# Patient Record
Sex: FEMALE | Race: BLACK OR AFRICAN AMERICAN | Employment: OTHER | URBAN - METROPOLITAN AREA
[De-identification: names, ages, dates, MRNs, and addresses within clinical notes are randomized per-mention and may not be internally consistent; named-entity substitution may affect disease eponyms.]

---

## 2018-05-23 PROBLEM — Z79.01 LONG TERM (CURRENT) USE OF ANTICOAGULANTS: Status: ACTIVE | Noted: 2018-05-23

## 2019-01-31 ENCOUNTER — HOSPITAL ENCOUNTER (OUTPATIENT)
Dept: LAB | Age: 69
Discharge: HOME | End: 2019-01-31
Payer: MEDICARE

## 2019-01-31 DIAGNOSIS — I25.738 CORONARY ARTERY DISEASE OF NON-AUTOLOGOUS BIOLOGICAL BYPASS GRAFT WITH STABLE ANGINA PECTORIS (HCC): ICD-10-CM

## 2019-01-31 DIAGNOSIS — E78.5 DYSLIPIDEMIA: ICD-10-CM

## 2019-01-31 LAB
ALBUMIN SERPL-MCNC: 3.6 G/DL (ref 3.2–4.6)
ALBUMIN/GLOB SERPL: 0.9 {RATIO}
ALP SERPL-CCNC: 89 U/L (ref 50–136)
ALT SERPL-CCNC: 27 U/L (ref 12–65)
AST SERPL-CCNC: 32 U/L (ref 15–37)
BILIRUB DIRECT SERPL-MCNC: 0.1 MG/DL
BILIRUB SERPL-MCNC: 0.4 MG/DL (ref 0.2–1.1)
CHOLEST SERPL-MCNC: 168 MG/DL
GLOBULIN SER CALC-MCNC: 3.8 G/DL
HDLC SERPL-MCNC: 58 MG/DL (ref 40–60)
HDLC SERPL: 2.9 {RATIO}
LDLC SERPL CALC-MCNC: 102 MG/DL
LIPID PROFILE,FLP: NORMAL
PROT SERPL-MCNC: 7.4 G/DL (ref 6.3–8.2)
TRIGL SERPL-MCNC: 40 MG/DL (ref 35–150)
VLDLC SERPL CALC-MCNC: 8 MG/DL (ref 6–23)

## 2019-01-31 PROCEDURE — 80076 HEPATIC FUNCTION PANEL: CPT

## 2019-01-31 PROCEDURE — 80061 LIPID PANEL: CPT

## 2019-01-31 PROCEDURE — 36415 COLL VENOUS BLD VENIPUNCTURE: CPT

## 2019-08-02 ENCOUNTER — HOSPITAL ENCOUNTER (OUTPATIENT)
Dept: LAB | Age: 69
Discharge: HOME | End: 2019-08-02
Payer: MEDICARE

## 2019-08-02 DIAGNOSIS — E78.5 DYSLIPIDEMIA: ICD-10-CM

## 2019-08-02 DIAGNOSIS — I25.119 CORONARY ARTERY DISEASE INVOLVING NATIVE CORONARY ARTERY OF NATIVE HEART WITH ANGINA PECTORIS (HCC): Chronic | ICD-10-CM

## 2019-08-02 LAB
ALBUMIN SERPL-MCNC: 3.6 G/DL (ref 3.2–4.6)
ALBUMIN/GLOB SERPL: 1 {RATIO} (ref 1.2–3.5)
ALP SERPL-CCNC: 83 U/L (ref 50–136)
ALT SERPL-CCNC: 23 U/L (ref 12–65)
AST SERPL-CCNC: 33 U/L (ref 15–37)
BILIRUB DIRECT SERPL-MCNC: 0.2 MG/DL
BILIRUB SERPL-MCNC: 0.5 MG/DL (ref 0.2–1.1)
CHOLEST SERPL-MCNC: 160 MG/DL
GLOBULIN SER CALC-MCNC: 3.5 G/DL (ref 2.3–3.5)
HDLC SERPL-MCNC: 52 MG/DL (ref 40–60)
HDLC SERPL: 3.1 {RATIO}
LDLC SERPL CALC-MCNC: 100 MG/DL
LIPID PROFILE,FLP: NORMAL
PROT SERPL-MCNC: 7.1 G/DL (ref 6.3–8.2)
TRIGL SERPL-MCNC: 40 MG/DL (ref 35–150)
VLDLC SERPL CALC-MCNC: 8 MG/DL (ref 6–23)

## 2019-08-02 PROCEDURE — 36415 COLL VENOUS BLD VENIPUNCTURE: CPT

## 2019-08-02 PROCEDURE — 80061 LIPID PANEL: CPT

## 2019-08-02 PROCEDURE — 80076 HEPATIC FUNCTION PANEL: CPT

## 2021-05-26 ENCOUNTER — HOSPITAL ENCOUNTER (OUTPATIENT)
Dept: LAB | Age: 71
Discharge: HOME | End: 2021-05-26
Payer: MEDICARE

## 2021-05-26 DIAGNOSIS — I25.5 ISCHEMIC CARDIOMYOPATHY: Chronic | ICD-10-CM

## 2021-05-26 DIAGNOSIS — E78.5 DYSLIPIDEMIA: ICD-10-CM

## 2021-05-26 LAB
ALBUMIN SERPL-MCNC: 3.7 G/DL (ref 3.2–4.6)
ALBUMIN/GLOB SERPL: 0.9 {RATIO} (ref 1.2–3.5)
ALP SERPL-CCNC: 100 U/L (ref 50–136)
ALT SERPL-CCNC: 25 U/L (ref 12–65)
AST SERPL-CCNC: 28 U/L (ref 15–37)
BILIRUB DIRECT SERPL-MCNC: 0.1 MG/DL
BILIRUB SERPL-MCNC: 0.4 MG/DL (ref 0.2–1.1)
CHOLEST SERPL-MCNC: 180 MG/DL
GLOBULIN SER CALC-MCNC: 3.9 G/DL (ref 2.3–3.5)
HDLC SERPL-MCNC: 57 MG/DL (ref 40–60)
HDLC SERPL: 3.2 {RATIO}
LDLC SERPL CALC-MCNC: 116.8 MG/DL
PROT SERPL-MCNC: 7.6 G/DL (ref 6.3–8.2)
TRIGL SERPL-MCNC: 31 MG/DL (ref 35–150)
VLDLC SERPL CALC-MCNC: 6.2 MG/DL (ref 6–23)

## 2021-05-26 PROCEDURE — 36415 COLL VENOUS BLD VENIPUNCTURE: CPT

## 2021-05-26 PROCEDURE — 80061 LIPID PANEL: CPT

## 2021-05-26 PROCEDURE — 80076 HEPATIC FUNCTION PANEL: CPT

## 2021-05-27 NOTE — PROGRESS NOTES
Cholesterol is worse despite maximum Lipitor. Continue Lipitor as currently taking but add Zetia 10 mg daily. Repeat lipids in 3 months.   Needs to try to eat low-fat low-cholesterol diet is much as possible

## 2022-03-19 PROBLEM — Z79.01 LONG TERM (CURRENT) USE OF ANTICOAGULANTS: Status: ACTIVE | Noted: 2018-05-23

## 2022-05-20 LAB — INR BLD: 2.2

## 2022-05-24 PROBLEM — I51.3 APICAL MURAL THROMBUS: Status: ACTIVE | Noted: 2022-05-24

## 2022-05-24 PROBLEM — Z95.1 HX OF CABG: Status: ACTIVE | Noted: 2022-05-24

## 2022-05-25 ENCOUNTER — ANTI-COAG VISIT (OUTPATIENT)
Dept: CARDIOLOGY CLINIC | Age: 72
End: 2022-05-25

## 2022-05-25 ENCOUNTER — OFFICE VISIT (OUTPATIENT)
Dept: CARDIOLOGY CLINIC | Age: 72
End: 2022-05-25
Payer: MEDICARE

## 2022-05-25 VITALS
SYSTOLIC BLOOD PRESSURE: 130 MMHG | DIASTOLIC BLOOD PRESSURE: 68 MMHG | BODY MASS INDEX: 21.93 KG/M2 | WEIGHT: 139.7 LBS | HEIGHT: 67 IN | HEART RATE: 76 BPM

## 2022-05-25 DIAGNOSIS — I51.3 APICAL MURAL THROMBUS: ICD-10-CM

## 2022-05-25 DIAGNOSIS — Z79.01 LONG TERM (CURRENT) USE OF ANTICOAGULANTS: ICD-10-CM

## 2022-05-25 DIAGNOSIS — I50.22 CHRONIC SYSTOLIC CONGESTIVE HEART FAILURE (HCC): ICD-10-CM

## 2022-05-25 DIAGNOSIS — I10 PRIMARY HYPERTENSION: ICD-10-CM

## 2022-05-25 DIAGNOSIS — I25.5 ISCHEMIC CARDIOMYOPATHY: ICD-10-CM

## 2022-05-25 DIAGNOSIS — Z95.1 HX OF CABG: Primary | ICD-10-CM

## 2022-05-25 DIAGNOSIS — E78.5 DYSLIPIDEMIA: ICD-10-CM

## 2022-05-25 DIAGNOSIS — Z79.01 LONG TERM (CURRENT) USE OF ANTICOAGULANTS: Primary | ICD-10-CM

## 2022-05-25 PROCEDURE — 1123F ACP DISCUSS/DSCN MKR DOCD: CPT | Performed by: INTERNAL MEDICINE

## 2022-05-25 PROCEDURE — 99214 OFFICE O/P EST MOD 30 MIN: CPT | Performed by: INTERNAL MEDICINE

## 2022-05-25 PROCEDURE — 93000 ELECTROCARDIOGRAM COMPLETE: CPT | Performed by: INTERNAL MEDICINE

## 2022-05-25 RX ORDER — RAMIPRIL 10 MG/1
10 CAPSULE ORAL DAILY
Qty: 90 CAPSULE | Refills: 3 | Status: SHIPPED | OUTPATIENT
Start: 2022-05-25

## 2022-05-25 RX ORDER — WARFARIN SODIUM 5 MG/1
TABLET ORAL
Qty: 180 TABLET | Refills: 3 | Status: SHIPPED | OUTPATIENT
Start: 2022-05-25

## 2022-05-25 RX ORDER — ERGOCALCIFEROL (VITAMIN D2) 1250 MCG
50000 CAPSULE ORAL WEEKLY
COMMUNITY
Start: 2021-12-07

## 2022-05-25 RX ORDER — CARVEDILOL 6.25 MG/1
6.25 TABLET ORAL 2 TIMES DAILY
Qty: 180 TABLET | Refills: 3 | Status: SHIPPED | OUTPATIENT
Start: 2022-05-25

## 2022-05-25 RX ORDER — ATORVASTATIN CALCIUM 80 MG/1
80 TABLET, FILM COATED ORAL DAILY
Qty: 90 TABLET | Refills: 3 | Status: SHIPPED | OUTPATIENT
Start: 2022-05-25

## 2022-05-25 ASSESSMENT — ENCOUNTER SYMPTOMS
ABDOMINAL PAIN: 0
CONSTIPATION: 0
SORE THROAT: 0
COUGH: 0
PHOTOPHOBIA: 0
DIARRHEA: 0
SHORTNESS OF BREATH: 0
ABDOMINAL DISTENTION: 0

## 2022-05-25 NOTE — PROGRESS NOTES
[facility] CARDIOLOGY  [address], [address]  PHONE: [phone]        NAME:  Courtney Grover  : 1950  MRN: [id number]     PCP:  Manpreet Marr MD      SUBJECTIVE:   Courtney Grover is a 70 y.o. female seen for a follow up visit regarding the following:     Chief Complaint   Patient presents with    Hyperlipidemia    Coronary Artery Disease       HPI:    Doing well since last visit without interval angina, CHF, palpitations, edema, presyncope or syncope. Vitals controlled and tolerating meds well. Staying active without any significant limitations. Walking sporadically for exercise but not regularly. LDL high but sporadically taking zetia (causing lower GI bloating/discomfort) and missing lipitor again. Discussed repatha but she prefers to avoid injection for now and promises to be more compliant with statin/zetia- if LDL remains high on repeat LDL we will try to convince her to try bio-injectable at that time. Past Medical History, Past Surgical History, Family history, Social History, and Medications were all reviewed with the patient today and updated as necessary.      Current Outpatient Medications   Medication Sig Dispense Refill    ergocalciferol (ERGOCALCIFEROL) 1.25 MG (55168 UT) capsule Take 50,000 Units by mouth once a week      atorvastatin (LIPITOR) 80 MG tablet Take 1 tablet by mouth daily TAKE ONE TABLET BY MOUTH ONE TIME DAILY 90 tablet 3    carvedilol (COREG) 6.25 MG tablet Take 1 tablet by mouth 2 times daily TAKE ONE TABLET BY MOUTH TWICE A DAY WITH MEAL(S) 180 tablet 3    ramipril (ALTACE) 10 MG capsule Take 1 capsule by mouth daily 90 capsule 3    warfarin (COUMADIN) 5 MG tablet Take 2 tabs every day or as directed 180 tablet 3    alendronate (FOSAMAX) 70 MG tablet Take 70 mg by mouth every 7 days      aspirin 81 MG EC tablet Take by mouth daily      metFORMIN (GLUCOPHAGE-XR) 500 MG extended release tablet Takes one am, Two pm      ezetimibe (ZETIA) 10 MG tablet Take 10 mg by mouth daily (Patient not taking: Reported on 5/25/2022)       No current facility-administered medications for this visit. Allergies   Allergen Reactions    Ezetimibe      Myalgias    Niaspan [Niacin]      Myalgias       Patient Active Problem List    Diagnosis Date Noted    Hx of CABG 05/24/2022     Priority: Medium    Apical mural thrombus 05/24/2022     Priority: Medium    Long term (current) use of anticoagulants 05/23/2018    CAD (coronary artery disease), nonautologous biological bypass graft 06/03/2016    Ischemic cardiomyopathy 06/03/2016    Chronic systolic congestive heart failure ([facility] [address] 06/03/2016     Overview Note:     45-50% s/p AMI, CABG with apical aneurysmectomy, still with small apical   aneurysm present, high risk nidus for LV clot, on permanent coumadin as as   result        Osteoporosis 12/13/2012    DM2 (diabetes mellitus, type 2) ([facility] [address] 12/13/2012    Dyslipidemia 12/13/2012    CAD (coronary artery disease) 12/13/2012     Overview Note:     S/p MI and CABG and LV aneurysmectomy- lifelong Coumadin        HTN (hypertension) 12/13/2012        Past Surgical History:   Procedure Laterality Date    CARDIAC CATHETERIZATION  11/01/2007    CORONARY ARTERY BYPASS GRAFT  11/2007    with LV aneursymectomy    HYSTERECTOMY, TOTAL ABDOMINAL  1985    fibroids and dysplasia       Family History   Problem Relation Age of Onset    Cancer Sister 52        COLON    Cancer Maternal Aunt         Colon    Cancer Maternal Uncle         Bone    Elevated Lipids Sister     Diabetes Paternal Uncle     Elevated Lipids Sister     Elevated Lipids Sister     Elevated Lipids Sister     Cancer Mother 77        BREAST        Social History     Tobacco Use    Smoking status: Never Smoker    Smokeless tobacco: Never Used   Substance Use Topics    Alcohol use: Yes       ROS:    Review of Systems   Constitutional: Positive for fatigue. Negative for appetite change, chills and diaphoresis. HENT: Negative for congestion, mouth sores, nosebleeds, sore throat and tinnitus. Eyes: Negative for photophobia and visual disturbance. Respiratory: Negative for cough and shortness of breath. Cardiovascular: Negative for chest pain, palpitations and leg swelling. Gastrointestinal: Negative for abdominal distention, abdominal pain, constipation and diarrhea. Endocrine: Negative for cold intolerance, heat intolerance, polydipsia and polyuria. Genitourinary: Negative for dysuria and hematuria. Musculoskeletal: Negative for arthralgias, joint swelling and myalgias. Skin: Negative for rash. Allergic/Immunologic: Negative for environmental allergies and food allergies. Neurological: Negative for dizziness, seizures, syncope and light-headedness. Hematological: Negative for adenopathy. Does not bruise/bleed easily. Psychiatric/Behavioral: Negative for agitation, behavioral problems, dysphoric mood and hallucinations. The patient is not nervous/anxious. PHYSICAL EXAM:     Vitals:    05/25/22 1534   BP: 130/68   Pulse: 76   Weight: 139 lb 11.2 oz (63.4 kg)   Height: 5' 7\" (1.702 m)      Wt Readings from Last 3 Encounters:   05/25/22 139 lb 11.2 oz (63.4 kg)   11/22/21 144 lb 1.6 oz (65.4 kg)   05/26/21 143 lb (64.9 kg)      BP Readings from Last 3 Encounters:   05/25/22 130/68   11/22/21 138/74   05/26/21 136/84        Physical Exam  Constitutional:       Appearance: Normal appearance. She is normal weight. HENT:      Head: Normocephalic and atraumatic. Nose: Nose normal.      Mouth/Throat:      Mouth: Mucous membranes are moist.      Pharynx: Oropharynx is clear. Eyes:      Extraocular Movements: Extraocular movements intact. Pupils: Pupils are equal, round, and reactive to light. Neck:      Vascular: No carotid bruit or JVD. Cardiovascular:      Rate and Rhythm: Normal rate and regular rhythm. Heart sounds:  No murmur heard. No friction rub. No gallop. Pulmonary:      Effort: Pulmonary effort is normal.      Breath sounds: Normal breath sounds. No wheezing or rhonchi. Abdominal:      General: Abdomen is flat. Bowel sounds are normal. There is no distension. Palpations: Abdomen is soft. Tenderness: There is no abdominal tenderness. Musculoskeletal:         General: No swelling. Normal range of motion. Cervical back: Normal range of motion and neck supple. No tenderness. Skin:     General: Skin is warm and dry. Neurological:      General: No focal deficit present. Mental Status: She is alert and oriented to person, place, and time. Mental status is at baseline. Psychiatric:         Mood and Affect: Mood normal.         Behavior: Behavior normal.          Medical problems and test results were reviewed with the patient today. Lab Results   Component Value Date    CHOL 180 05/26/2021    CHOL 160 08/02/2019     Lab Results   Component Value Date    TRIG 31 (L) 05/26/2021    TRIG 40 08/02/2019     Lab Results   Component Value Date    HDL 57 05/26/2021    HDL 52 08/02/2019     Lab Results   Component Value Date    LDLCALC 116.8 (H) 05/26/2021    LDLCALC 100 08/02/2019     Lab Results   Component Value Date    LABVLDL 6.2 05/26/2021    LABVLDL 8 08/02/2019     Lab Results   Component Value Date    CHOLHDLRATIO 3.2 05/26/2021    CHOLHDLRATIO 3.1 08/02/2019     Results for orders placed or performed in visit on 05/25/22   EKG 12 lead    Impression    Sinus  Rhythm 76 bpm  -Right bundle branch block with left axis -bifascicular block. Old anterior infarct  Nonspecific ST-T wave changes        ASSESSMENT and PLAN    Carlo Coto was seen today for hyperlipidemia and coronary artery disease. Diagnoses and all orders for this visit:    Hx of CABG-doing well with no interval angina, syncope, CHF. Continue meds but try to increase dietary/exercise regimen.  -     Lipid Panel;  Future  -     Hepatic Function Panel; Future    Dyslipidemia-noncompliant with Zetia, taking sporadically but causing recurrent lower abdominal bloating and discomfort. Missing Lipitor at times as well. LDL chronically greater than 100. Emphasized compliance with statin, considering trying Zetia again. She wants to recheck her lipids in the next few weeks. If it is still elevated she will consider Repatha/Praluent but at this point she is not ready to commit. -     Lipid Panel; Future  -     Hepatic Function Panel; Future    Long term (current) use of anticoagulants-see below. Tolerating Coumadin well    Chronic systolic congestive heart failure (HCC)-stable, recheck echo next year. Clinically euvolemic and medically maximized  -     EKG 12 lead    Primary hypertension-controlled, continue meds and titrate. Low-sodium diet lifelong    Ischemic cardiomyopathy-well compensated, continue meds, increase dietary measures and exercise daily as tolerated  -     EKG 12 lead    Apical mural thrombus-lifelong anticoagulation as tolerated. Following pro time in our office. Stable with no interval bleeding. Other orders  -     atorvastatin (LIPITOR) 80 MG tablet; Take 1 tablet by mouth daily TAKE ONE TABLET BY MOUTH ONE TIME DAILY  -     carvedilol (COREG) 6.25 MG tablet; Take 1 tablet by mouth 2 times daily TAKE ONE TABLET BY MOUTH TWICE A DAY WITH MEAL(S)  -     ramipril (ALTACE) 10 MG capsule; Take 1 capsule by mouth daily  -     warfarin (COUMADIN) 5 MG tablet; Take 2 tabs every day or as directed            Return in about 6 months (around 11/25/2022).          Erik Villegas MD  5/25/2022  4:05 PM

## 2022-05-25 NOTE — PROGRESS NOTES
Anticoagulation Summary  As of 2022    INR goal:  2.0-3.0   TTR:  --   INR used for dosin.20 (2022)   Warfarin maintenance plan:  10 mg (5 mg x 2) every Mon, Wed, Fri; 7.5 mg (5 mg x 1.5) all other days   Weekly warfarin total:  60 mg   Plan last modified:  [address] [facility] [place] (2022)   Next INR check:  2022   Target end date: Indefinite    Indications    Long term (current) use of anticoagulants [Z79.01]  Apical mural thrombus [I51.3]             Anticoagulation Episode Summary     INR check location:  Outside Lab    Preferred lab:      Send INR reminders to:  [address]    Comments:  [facility]      Anticoagulation Care Providers     Provider Role Specialty Phone number    Ofelia Wing MD Responsible Cardiology [phone]      Tablet strength and weekly dosing schedule confirmed today.

## 2022-07-12 LAB — INR BLD: 3

## 2022-07-13 ENCOUNTER — ANTI-COAG VISIT (OUTPATIENT)
Dept: CARDIOLOGY CLINIC | Age: 72
End: 2022-07-13

## 2022-07-13 DIAGNOSIS — Z79.01 LONG TERM (CURRENT) USE OF ANTICOAGULANTS: Primary | ICD-10-CM

## 2022-07-13 DIAGNOSIS — I51.3 APICAL MURAL THROMBUS: ICD-10-CM

## 2022-07-13 NOTE — PATIENT INSTRUCTIONS
Reminder: Please contact the Coumadin Clinic at [phone] when you have medication changes. Examples, new medications, antibiotics, discontinued medications, new supplements, missed doses of warfarin or if you took extra doses of warfarin. This also includes OTC medications. Notifying us helps reduce the possibility of high and low INR's. In addition, if warfarin needs to be held for any procedures, please have surgeon or physician's office contact us before holding anticoagulant. Thanks, [facility] Cardiology Coumadin Clinic.

## 2022-07-13 NOTE — PROGRESS NOTES
INR result received by fax from [facility]. I tried to reach the patient but her voice mailbox was full and could not leave a message.

## 2022-08-31 LAB — INR BLD: 1.7

## 2022-09-01 ENCOUNTER — ANTI-COAG VISIT (OUTPATIENT)
Dept: CARDIOLOGY CLINIC | Age: 72
End: 2022-09-01

## 2022-09-01 DIAGNOSIS — I51.3 APICAL MURAL THROMBUS: ICD-10-CM

## 2022-09-01 DIAGNOSIS — Z79.01 LONG TERM (CURRENT) USE OF ANTICOAGULANTS: Primary | ICD-10-CM

## 2022-09-01 NOTE — PATIENT INSTRUCTIONS
Reminder: Please contact the Coumadin Clinic at [phone] when you have medication changes. Examples, new medications, antibiotics, discontinued medications, new supplements, missed doses of warfarin or if you took extra doses of warfarin. This also includes OTC medications. Notifying us helps reduce the possibility of high and low INR's. In addition, if warfarin needs to be held for any procedures, please have surgeon or physician's office contact us before holding anticoagulant. Thanks, [facility] Cardiology Coumadin Clinic.

## 2022-09-01 NOTE — PROGRESS NOTES
INR result received by fax from [facility], I tried to reach the patient to review result of 1.7. I had to leave a voicemail asking for her to give us a call back.

## 2022-09-01 NOTE — PROGRESS NOTES
I spoke with the patient, she knows of no reason for low INR.  Temporary adjustment for tonight's dose, take 10 mg instead of 7.5 mg.

## 2022-09-22 DIAGNOSIS — Z79.01 LONG TERM (CURRENT) USE OF ANTICOAGULANTS: ICD-10-CM

## 2022-09-22 DIAGNOSIS — I51.3 APICAL MURAL THROMBUS: Primary | ICD-10-CM

## 2022-09-22 LAB — INR BLD: 3

## 2022-09-23 ENCOUNTER — ANTI-COAG VISIT (OUTPATIENT)
Dept: CARDIOLOGY CLINIC | Age: 72
End: 2022-09-23

## 2022-09-23 DIAGNOSIS — I51.3 APICAL MURAL THROMBUS: ICD-10-CM

## 2022-09-23 DIAGNOSIS — Z79.01 LONG TERM (CURRENT) USE OF ANTICOAGULANTS: Primary | ICD-10-CM

## 2022-09-23 NOTE — PATIENT INSTRUCTIONS
Reminder: Please contact the Coumadin Clinic at [phone] when you have medication changes. Examples, new medications, antibiotics, discontinued medications, new supplements, missed doses of warfarin or if you took extra doses of warfarin. This also includes OTC medications. Notifying us helps reduce the possibility of high and low INR's. In addition, if warfarin needs to be held for any procedures, please have surgeon or physician's office contact us before holding anticoagulant. Thanks, [facility] Cardiology Coumadin Clinic.

## 2022-09-23 NOTE — PROGRESS NOTES
Tablet strength and weekly dosing schedule confirmed today. INR result received by fax from [facility], I spoke with the patient and reviewed result.

## 2022-11-01 LAB
INR BLD: 2.4
INR BLD: 2.4

## 2022-11-02 ENCOUNTER — ANTI-COAG VISIT (OUTPATIENT)
Dept: CARDIOLOGY CLINIC | Age: 72
End: 2022-11-02

## 2022-11-02 DIAGNOSIS — I51.3 APICAL MURAL THROMBUS: ICD-10-CM

## 2022-11-02 DIAGNOSIS — Z79.01 LONG TERM (CURRENT) USE OF ANTICOAGULANTS: Primary | ICD-10-CM

## 2022-11-07 ENCOUNTER — ANTI-COAG VISIT (OUTPATIENT)
Dept: CARDIOLOGY CLINIC | Age: 72
End: 2022-11-07

## 2022-11-07 DIAGNOSIS — I51.3 APICAL MURAL THROMBUS: ICD-10-CM

## 2022-11-07 DIAGNOSIS — Z79.01 LONG TERM (CURRENT) USE OF ANTICOAGULANTS: Primary | ICD-10-CM

## 2022-12-05 DIAGNOSIS — I51.3 APICAL MURAL THROMBUS: ICD-10-CM

## 2022-12-05 DIAGNOSIS — Z95.1 HX OF CABG: ICD-10-CM

## 2022-12-05 DIAGNOSIS — E78.5 DYSLIPIDEMIA: ICD-10-CM

## 2022-12-05 DIAGNOSIS — Z79.01 LONG TERM (CURRENT) USE OF ANTICOAGULANTS: ICD-10-CM

## 2022-12-06 DIAGNOSIS — Z79.01 LONG TERM (CURRENT) USE OF ANTICOAGULANTS: Primary | ICD-10-CM

## 2022-12-06 DIAGNOSIS — I51.3 APICAL MURAL THROMBUS: ICD-10-CM

## 2022-12-06 LAB
ALBUMIN SERPL-MCNC: 3.8 G/DL (ref 3.2–4.6)
ALBUMIN/GLOB SERPL: 1.1 {RATIO} (ref 0.4–1.6)
ALP SERPL-CCNC: 82 U/L (ref 50–136)
ALT SERPL-CCNC: 22 U/L (ref 12–65)
AST SERPL-CCNC: 27 U/L (ref 15–37)
BILIRUB DIRECT SERPL-MCNC: <0.1 MG/DL
BILIRUB SERPL-MCNC: 0.4 MG/DL (ref 0.2–1.1)
CHOLEST SERPL-MCNC: 207 MG/DL
GLOBULIN SER CALC-MCNC: 3.4 G/DL (ref 2.8–4.5)
HDLC SERPL-MCNC: 60 MG/DL (ref 40–60)
HDLC SERPL: 3.5 {RATIO}
LDLC SERPL CALC-MCNC: 137.4 MG/DL
PROT SERPL-MCNC: 7.2 G/DL (ref 6.3–8.2)
TRIGL SERPL-MCNC: 48 MG/DL (ref 35–150)
VLDLC SERPL CALC-MCNC: 9.6 MG/DL (ref 6–23)

## 2022-12-06 ASSESSMENT — ENCOUNTER SYMPTOMS
ABDOMINAL DISTENTION: 0
COUGH: 0
ABDOMINAL PAIN: 0
SORE THROAT: 0
SHORTNESS OF BREATH: 0
PHOTOPHOBIA: 0
DIARRHEA: 0
CONSTIPATION: 0

## 2022-12-06 NOTE — PROGRESS NOTES
[facility] CARDIOLOGY  [address], [address] [facility], [address] [place] [address]  PHONE: [phone]        NAME:  Tamera Manriquez  : 1950  MRN: [id number]     PCP:  Yeimy Gutierrez MD      SUBJECTIVE:   Tamera Manriquez is a 67 y.o. female seen for a follow up visit regarding the following:     Chief Complaint   Patient presents with    Hyperlipidemia     6 MONTH FOLLOW UP    Hypertension    Coronary Artery Disease       HPI:    Doing well since last visit without interval angina, CHF, palpitations, edema, presyncope or syncope. Vitals controlled and tolerating meds well. Staying active without any significant limitations. Walking sporadically for exercise but not regularly. LDL high but taking 80mg lipitor daily. Intolerant to zetia in past with GI discomfort. She cannot give herself repatha injections. She will consider [place] but isn't ready to commit. She will call me. Past Medical History, Past Surgical History, Family history, Social History, and Medications were all reviewed with the patient today and updated as necessary. Current Outpatient Medications   Medication Sig Dispense Refill    ergocalciferol (ERGOCALCIFEROL) 1.25 MG (87587 UT) capsule Take 50,000 Units by mouth once a week      atorvastatin (LIPITOR) 80 MG tablet Take 1 tablet by mouth daily TAKE ONE TABLET BY MOUTH ONE TIME DAILY 90 tablet 3    carvedilol (COREG) 6.25 MG tablet Take 1 tablet by mouth 2 times daily TAKE ONE TABLET BY MOUTH TWICE A DAY WITH MEAL(S) 180 tablet 3    ramipril (ALTACE) 10 MG capsule Take 1 capsule by mouth daily 90 capsule 3    warfarin (COUMADIN) 5 MG tablet Take 2 tabs every day or as directed 180 tablet 3    aspirin 81 MG EC tablet Take by mouth daily      metFORMIN (GLUCOPHAGE-XR) 500 MG extended release tablet 500 mg Takes two am, Two pm       No current facility-administered medications for this visit.             Allergies   Allergen Reactions    Ezetimibe      Myalgias Niaspan [Niacin]      Myalgias       Patient Active Problem List    Diagnosis Date Noted    Hx of CABG 05/24/2022     Priority: Medium    Apical mural thrombus 05/24/2022     Priority: Medium    Long term (current) use of anticoagulants 05/23/2018    CAD (coronary artery disease), nonautologous biological bypass graft 06/03/2016    Ischemic cardiomyopathy 06/03/2016    Chronic systolic congestive heart failure ([facility] [address] 06/03/2016     Overview Note:     45-50% s/p AMI, CABG with apical aneurysmectomy, still with small apical   aneurysm present, high risk nidus for LV clot, on permanent coumadin as as   result        Osteoporosis 12/13/2012    DM2 (diabetes mellitus, type 2) ([facility] [address] 12/13/2012    Dyslipidemia 12/13/2012    CAD (coronary artery disease) 12/13/2012     Overview Note:     S/p MI and CABG and LV aneurysmectomy- lifelong Coumadin        HTN (hypertension) 12/13/2012        Past Surgical History:   Procedure Laterality Date    CARDIAC CATHETERIZATION  11/01/2007    CORONARY ARTERY BYPASS GRAFT  11/2007    with LV aneursymectomy    HYSTERECTOMY, TOTAL ABDOMINAL (CERVIX REMOVED)  1985    fibroids and dysplasia       Family History   Problem Relation Age of Onset    Cancer Sister 52        COLON    Cancer Maternal Aunt         Colon    Cancer Maternal Uncle         Bone    Elevated Lipids Sister     Diabetes Paternal Uncle     Elevated Lipids Sister     Elevated Lipids Sister     Elevated Lipids Sister     Cancer Mother 77        BREAST        Social History     Tobacco Use    Smoking status: Never    Smokeless tobacco: Never   Substance Use Topics    Alcohol use: Yes       ROS:    Review of Systems   Constitutional:  Positive for fatigue. Negative for appetite change, chills and diaphoresis. HENT:  Negative for congestion, mouth sores, nosebleeds, sore throat and tinnitus. Eyes:  Negative for photophobia and visual disturbance. Respiratory:  Negative for cough and shortness of breath.     Cardiovascular: Negative for chest pain, palpitations and leg swelling. Gastrointestinal:  Negative for abdominal distention, abdominal pain, constipation and diarrhea. Endocrine: Negative for cold intolerance, heat intolerance, polydipsia and polyuria. Genitourinary:  Negative for dysuria and hematuria. Musculoskeletal:  Negative for arthralgias, joint swelling and myalgias. Skin:  Negative for rash. Allergic/Immunologic: Negative for environmental allergies and food allergies. Neurological:  Negative for dizziness, seizures, syncope and light-headedness. Hematological:  Negative for adenopathy. Does not bruise/bleed easily. Psychiatric/Behavioral:  Negative for agitation, behavioral problems, dysphoric mood and hallucinations. The patient is not nervous/anxious. PHYSICAL EXAM:     Vitals:    12/07/22 1101   BP: 134/76   Pulse: 90   Weight: 145 lb (65.8 kg)   Height: 5' 7\" (1.702 m)      Wt Readings from Last 3 Encounters:   12/07/22 145 lb (65.8 kg)   05/25/22 139 lb 11.2 oz (63.4 kg)   11/22/21 144 lb 1.6 oz (65.4 kg)      BP Readings from Last 3 Encounters:   12/07/22 134/76   05/25/22 130/68   11/22/21 138/74        Physical Exam  Constitutional:       Appearance: Normal appearance. She is normal weight. HENT:      Head: Normocephalic and atraumatic. Nose: Nose normal.      Mouth/Throat:      Mouth: Mucous membranes are moist.      Pharynx: Oropharynx is clear. Eyes:      Extraocular Movements: Extraocular movements intact. Pupils: Pupils are equal, round, and reactive to light. Neck:      Vascular: No carotid bruit or JVD. Cardiovascular:      Rate and Rhythm: Normal rate and regular rhythm. Heart sounds: No murmur heard. No friction rub. No gallop. Pulmonary:      Effort: Pulmonary effort is normal.      Breath sounds: Normal breath sounds. No wheezing or rhonchi. Abdominal:      General: Abdomen is flat. Bowel sounds are normal. There is no distension.       Palpations: Abdomen is soft. Tenderness: There is no abdominal tenderness. Musculoskeletal:         General: No swelling. Normal range of motion. Cervical back: Normal range of motion and neck supple. No tenderness. Skin:     General: Skin is warm and dry. Neurological:      General: No focal deficit present. Mental Status: She is alert and oriented to person, place, and time. Mental status is at baseline. Psychiatric:         Mood and Affect: Mood normal.         Behavior: Behavior normal.        Medical problems and test results were reviewed with the patient today. Lab Results   Component Value Date    CHOL 207 (H) 12/05/2022    CHOL 180 05/26/2021    CHOL 160 08/02/2019     Lab Results   Component Value Date    TRIG 48 12/05/2022    TRIG 31 (L) 05/26/2021    TRIG 40 08/02/2019     Lab Results   Component Value Date    HDL 60 12/05/2022    HDL 57 05/26/2021    HDL 52 08/02/2019     Lab Results   Component Value Date    LDLCALC 137.4 (H) 12/05/2022    LDLCALC 116.8 (H) 05/26/2021    LDLCALC 100 08/02/2019     Lab Results   Component Value Date    LABVLDL 9.6 12/05/2022    LABVLDL 6.2 05/26/2021    LABVLDL 8 08/02/2019     Lab Results   Component Value Date    CHOLHDLRATIO 3.5 12/05/2022    CHOLHDLRATIO 3.2 05/26/2021    CHOLHDLRATIO 3.1 08/02/2019     No results found for any visits on 12/07/22. ASSESSMENT and PLAN    Agnes Martinez was seen today for hyperlipidemia and coronary artery disease. Diagnoses and all orders for this visit:    Hx of CABG-doing well with no interval angina, syncope, CHF. Continue meds but try to increase dietary/exercise regimen. Needs better lipid control, see below    Dyslipidemia-noncompliant with Zetia due to GI intolerance, taking sporadically but causing recurrent lower abdominal bloating and discomfort. Denies missing any Lipitor recently, on max dose, but LDL chronically greater than 100.   Emphasized compliance with statin, considering tryin healthy diet and exercise. She does not think she can give herself twice monthly injections of Repatha or Praluent but is willing to consider Leqvio. She will call me if and when she decides to proceed. Long term (current) use of anticoagulants-see below. Tolerating Coumadin well    Chronic systolic congestive heart failure (HCC)-stable, recheck echo next year. Clinically euvolemic and medically maximized    Primary hypertension-controlled, continue meds and titrate. Low-sodium diet lifelong    Ischemic cardiomyopathy-well compensated, continue meds, increase dietary measures and exercise daily as tolerated    Apical mural thrombus-lifelong anticoagulation as tolerated. Following pro time in our office. Stable with no interval bleeding. Other orders  Consider repatha or lequvio- she has aversion to injections and just isn't ready to commit. She will call me with her decision sometime soon. Return in about 6 months (around 6/7/2023).          Corky San MD  12/7/2022  11:17 AM

## 2022-12-07 ENCOUNTER — OFFICE VISIT (OUTPATIENT)
Dept: CARDIOLOGY CLINIC | Age: 72
End: 2022-12-07
Payer: MEDICARE

## 2022-12-07 VITALS
HEIGHT: 67 IN | HEART RATE: 90 BPM | SYSTOLIC BLOOD PRESSURE: 134 MMHG | WEIGHT: 145 LBS | DIASTOLIC BLOOD PRESSURE: 76 MMHG | BODY MASS INDEX: 22.76 KG/M2

## 2022-12-07 DIAGNOSIS — Z95.1 HX OF CABG: ICD-10-CM

## 2022-12-07 DIAGNOSIS — Z79.01 LONG TERM (CURRENT) USE OF ANTICOAGULANTS: ICD-10-CM

## 2022-12-07 DIAGNOSIS — E78.5 DYSLIPIDEMIA: ICD-10-CM

## 2022-12-07 DIAGNOSIS — I25.5 ISCHEMIC CARDIOMYOPATHY: ICD-10-CM

## 2022-12-07 DIAGNOSIS — I10 PRIMARY HYPERTENSION: ICD-10-CM

## 2022-12-07 DIAGNOSIS — I25.10 CORONARY ARTERY DISEASE INVOLVING NATIVE CORONARY ARTERY OF NATIVE HEART WITHOUT ANGINA PECTORIS: Primary | ICD-10-CM

## 2022-12-07 DIAGNOSIS — I50.22 CHRONIC SYSTOLIC CONGESTIVE HEART FAILURE (HCC): ICD-10-CM

## 2022-12-07 PROCEDURE — 3078F DIAST BP <80 MM HG: CPT | Performed by: INTERNAL MEDICINE

## 2022-12-07 PROCEDURE — 3075F SYST BP GE 130 - 139MM HG: CPT | Performed by: INTERNAL MEDICINE

## 2022-12-07 PROCEDURE — 99214 OFFICE O/P EST MOD 30 MIN: CPT | Performed by: INTERNAL MEDICINE

## 2022-12-07 PROCEDURE — 1123F ACP DISCUSS/DSCN MKR DOCD: CPT | Performed by: INTERNAL MEDICINE

## 2022-12-10 LAB
INR PPP: 2.5 (ref 0.9–1.2)
PROTHROMBIN TIME: 24.8 SEC (ref 9.1–12)

## 2022-12-12 ENCOUNTER — ANTI-COAG VISIT (OUTPATIENT)
Dept: CARDIOLOGY CLINIC | Age: 72
End: 2022-12-12

## 2022-12-12 DIAGNOSIS — Z79.01 LONG TERM (CURRENT) USE OF ANTICOAGULANTS: Primary | ICD-10-CM

## 2022-12-12 DIAGNOSIS — I51.3 APICAL MURAL THROMBUS: ICD-10-CM

## 2022-12-12 NOTE — PROGRESS NOTES
Home INR monitor result reported via [facility], result and dosing reviewed with the patient. Continue current maintenance plan (see Anticoag Dosing Calendar). INR to be rechecked in four weeks.

## 2023-01-21 LAB
INR PPP: 3.1 (ref 0.9–1.2)
PROTHROMBIN TIME: 30.8 SEC (ref 9.1–12)

## 2023-01-23 ENCOUNTER — ANTI-COAG VISIT (OUTPATIENT)
Dept: CARDIOLOGY CLINIC | Age: 73
End: 2023-01-23
Payer: MEDICARE

## 2023-01-23 DIAGNOSIS — I51.3 APICAL MURAL THROMBUS: ICD-10-CM

## 2023-01-23 DIAGNOSIS — Z79.01 LONG TERM (CURRENT) USE OF ANTICOAGULANTS: Primary | ICD-10-CM

## 2023-01-23 PROCEDURE — 93793 ANTICOAG MGMT PT WARFARIN: CPT | Performed by: INTERNAL MEDICINE

## 2023-01-23 PROCEDURE — 85610 PROTHROMBIN TIME: CPT | Performed by: INTERNAL MEDICINE

## 2023-01-23 NOTE — PATIENT INSTRUCTIONS
Reminder: Please contact the Coumadin Clinic at [phone] when you have medication changes. Examples, new medications, antibiotics, discontinued medications, new supplements, missed doses of warfarin or if you took extra doses of warfarin. This also includes OTC medications. Notifying us helps reduce the possibility of high and low INR's. In addition, if warfarin needs to be held for any procedures, please have surgeon or physician's office contact us before holding anticoagulant. Thanks, [facility] Cardiology Coumadin Clinic.

## 2023-01-23 NOTE — PROGRESS NOTES
INR result received by fax from [facility]. Continue current maintenance plan (see Anticoag Dosing Calendar). INR to be rechecked in four week(s). Communicated with the patient via My Chart.

## 2023-03-06 LAB — INR BLD: 2

## 2023-03-07 ENCOUNTER — ANTI-COAG VISIT (OUTPATIENT)
Dept: CARDIOLOGY CLINIC | Age: 73
End: 2023-03-07
Payer: MEDICARE

## 2023-03-07 DIAGNOSIS — Z79.01 LONG TERM (CURRENT) USE OF ANTICOAGULANTS: Primary | ICD-10-CM

## 2023-03-07 DIAGNOSIS — I51.3 APICAL MURAL THROMBUS: ICD-10-CM

## 2023-03-07 LAB
INR PPP: 2 (ref 0.9–1.2)
PROTHROMBIN TIME: 20.2 SEC (ref 9.1–12)

## 2023-03-07 PROCEDURE — 93793 ANTICOAG MGMT PT WARFARIN: CPT | Performed by: INTERNAL MEDICINE

## 2023-03-07 NOTE — PROGRESS NOTES
Home INR monitor result reported via fax, therapeutic INR, no dose adjustments made. See (Anticoag Dosing Calendar). Recheck INR in four week(s).

## 2023-03-14 ENCOUNTER — ANTI-COAG VISIT (OUTPATIENT)
Dept: CARDIOLOGY CLINIC | Age: 73
End: 2023-03-14

## 2023-03-14 DIAGNOSIS — Z79.01 LONG TERM (CURRENT) USE OF ANTICOAGULANTS: Primary | ICD-10-CM

## 2023-03-14 DIAGNOSIS — I51.3 APICAL MURAL THROMBUS: ICD-10-CM

## 2023-04-19 ENCOUNTER — ANTI-COAG VISIT (OUTPATIENT)
Dept: CARDIOLOGY CLINIC | Age: 73
End: 2023-04-19
Payer: MEDICARE

## 2023-04-19 DIAGNOSIS — I51.3 APICAL MURAL THROMBUS: ICD-10-CM

## 2023-04-19 DIAGNOSIS — Z79.01 LONG TERM (CURRENT) USE OF ANTICOAGULANTS: Primary | ICD-10-CM

## 2023-04-19 LAB
INR PPP: 3.2 (ref 0.9–1.2)
PROTHROMBIN TIME: 31.6 SEC (ref 9.1–12)

## 2023-04-19 PROCEDURE — 93793 ANTICOAG MGMT PT WARFARIN: CPT | Performed by: INTERNAL MEDICINE

## 2023-04-19 NOTE — PROGRESS NOTES
INR result received by fax from [facility] lab, I spoke with the patient and reviewed result. Continue current maintenance plan (see Anticoag Dosing Calendar). INR to be rechecked in four week(s).

## 2023-05-16 ENCOUNTER — TELEPHONE (OUTPATIENT)
Age: 73
End: 2023-05-16

## 2023-05-16 NOTE — TELEPHONE ENCOUNTER
Physician's response faxed to [phone]. \"Low to moderate risk for sedation and anesthesia  Okay to hold warfarin 3-4 days prior to the procedure, resume postoperatively ASAP. Use aggressive local measures to control bleeding. \"  Mala Miller MD

## 2023-05-16 NOTE — TELEPHONE ENCOUNTER
Cardiac Clearance        Physician or Practice Requesting:[facility]-Dr. Gerda Smith Person: Cynthia  Contact Phone Number: [phone]  Fax Number: [phone]  Date of Surgery/Procedure: 05/23/2023  Type of Surgery or Procedure: Oral Surgery of 3 rd molar  Type of Anesthesia: IV or local    Type of Clearance Requested:   Medication to Hold:Coumadin  Days to Hold: Cardiologist to Determine

## 2023-05-16 NOTE — TELEPHONE ENCOUNTER
Low to moderate risk for sedation and anesthesia  Okay to hold warfarin 3-4 days prior to the procedure, resume postoperatively ASAP. Use aggressive local measures to control bleeding.

## 2023-06-08 LAB — INR BLD: 1.3

## 2023-06-09 ENCOUNTER — ANTI-COAG VISIT (OUTPATIENT)
Age: 73
End: 2023-06-09

## 2023-06-09 DIAGNOSIS — I51.3 APICAL MURAL THROMBUS: ICD-10-CM

## 2023-06-09 DIAGNOSIS — Z79.01 LONG TERM (CURRENT) USE OF ANTICOAGULANTS: Primary | ICD-10-CM

## 2023-06-09 NOTE — PROGRESS NOTES
Tablet strength and weekly dosing schedule confirmed today. Patient was off of warfarin for four days and also started dietary supplements with vitamin k. Increased her doses for the next few day and will recheck her while she is in the office on 06/14/2023.

## 2023-06-26 LAB — INR BLD: 1.9

## 2023-06-27 ENCOUNTER — ANTI-COAG VISIT (OUTPATIENT)
Age: 73
End: 2023-06-27
Payer: MEDICARE

## 2023-06-27 DIAGNOSIS — Z79.01 LONG TERM (CURRENT) USE OF ANTICOAGULANTS: Primary | ICD-10-CM

## 2023-06-27 DIAGNOSIS — I51.3 APICAL MURAL THROMBUS: ICD-10-CM

## 2023-06-27 PROCEDURE — 93793 ANTICOAG MGMT PT WARFARIN: CPT | Performed by: INTERNAL MEDICINE

## 2023-07-19 LAB — INR BLD: 1.5

## 2023-07-20 ENCOUNTER — ANTI-COAG VISIT (OUTPATIENT)
Age: 73
End: 2023-07-20

## 2023-07-20 DIAGNOSIS — I51.3 APICAL MURAL THROMBUS: ICD-10-CM

## 2023-07-20 DIAGNOSIS — Z79.01 LONG TERM (CURRENT) USE OF ANTICOAGULANTS: Primary | ICD-10-CM

## 2023-07-20 NOTE — PROGRESS NOTES
Patient has been taking a multi vitamin with vitamin k in it. She also started a Vitamin D supplement. Her diet has also changed and she is eating more leafy green vegetables.

## 2023-08-10 ENCOUNTER — TELEPHONE (OUTPATIENT)
Age: 73
End: 2023-08-10

## 2023-08-10 NOTE — TELEPHONE ENCOUNTER
Cardiac Clearance        Physician or Practice Requesting:Dr. Srinath Alatorre @ [facility]  : David Martelld  Contact Phone Number: [phone]  Fax Number: [phone]  Date of Surgery/Procedure: 11/30/2023  Type of Surgery or Procedure: Colonoscopy & Upper Endoscopy      Type of Anesthesia: N/A  Type of Clearance Requested:   Medication to Hold:Coumadin  Days to Hold: 5 days prior

## 2023-08-11 NOTE — TELEPHONE ENCOUNTER
Low to moderate risk, okay to hold warfarin 4 to 5 days as requested. Resume postoperatively ASAP when okay with operating physician.   Continue other cardiac medications throughout the procedure unnterrupted

## 2023-08-17 LAB — INR BLD: 2.4

## 2023-08-18 ENCOUNTER — ANTI-COAG VISIT (OUTPATIENT)
Age: 73
End: 2023-08-18

## 2023-08-18 DIAGNOSIS — Z79.01 LONG TERM (CURRENT) USE OF ANTICOAGULANTS: Primary | ICD-10-CM

## 2023-08-18 DIAGNOSIS — I51.3 APICAL MURAL THROMBUS: ICD-10-CM

## 2023-08-18 NOTE — PROGRESS NOTES
Warfarin tablet strength and weekly dosing schedule confirmed today. Continue current maintenance plan (see Anticoag Dosing Calendar). INR to be rechecked in four week(s).

## 2023-10-02 LAB — INR BLD: 1.5

## 2023-10-03 ENCOUNTER — ANTI-COAG VISIT (OUTPATIENT)
Age: 73
End: 2023-10-03

## 2023-10-03 DIAGNOSIS — Z79.01 LONG TERM (CURRENT) USE OF ANTICOAGULANTS: Primary | ICD-10-CM

## 2023-10-03 DIAGNOSIS — I51.3 APICAL MURAL THROMBUS: ICD-10-CM

## 2023-10-03 NOTE — PROGRESS NOTES
[facility] INR result reported via fax, subtherapeutic INR, . Tried to reach patient, no answer, no voice mail, will try again later today//[email or web address]    Talked with patient Warfarin tablet strength and weekly dosing schedule confirmed today. One time dose of 12.5 mg tonight instead of 10 mg. Then continue current maintenance plan (see Anticoag Dosing Calendar). INR to be rechecked in one week(s).  Has added Vitamin K2 to her medications

## 2023-10-12 LAB — INR BLD: 1.3

## 2023-10-13 ENCOUNTER — ANTI-COAG VISIT (OUTPATIENT)
Age: 73
End: 2023-10-13

## 2023-10-13 DIAGNOSIS — Z79.01 LONG TERM (CURRENT) USE OF ANTICOAGULANTS: Primary | ICD-10-CM

## 2023-10-13 DIAGNOSIS — I51.3 APICAL MURAL THROMBUS: ICD-10-CM

## 2023-10-19 LAB — INR BLD: 3.7

## 2023-10-20 ENCOUNTER — ANTI-COAG VISIT (OUTPATIENT)
Age: 73
End: 2023-10-20

## 2023-10-20 DIAGNOSIS — I51.3 APICAL MURAL THROMBUS: ICD-10-CM

## 2023-10-20 DIAGNOSIS — Z79.01 LONG TERM (CURRENT) USE OF ANTICOAGULANTS: Primary | ICD-10-CM

## 2023-10-20 NOTE — PROGRESS NOTES
INR report received from [facility]. Patient held her Vitamin K supplement for two days. She stated she did this to get a \"higher\" INR reading. I reminded her that she has been subtherapeutic many months now due to taking vitamin k supplement. She says she plans to resume it and continue taking it. Warfarin dosing not adjusted but recommended she have her INR rechecked in three days.

## 2023-10-31 LAB
INR BLD: 2.5
INR BLD: 2.5

## 2023-11-02 ENCOUNTER — TELEPHONE (OUTPATIENT)
Age: 73
End: 2023-11-02

## 2023-11-02 ENCOUNTER — ANTI-COAG VISIT (OUTPATIENT)
Age: 73
End: 2023-11-02

## 2023-11-02 DIAGNOSIS — I51.3 APICAL MURAL THROMBUS: ICD-10-CM

## 2023-11-02 DIAGNOSIS — Z79.01 LONG TERM (CURRENT) USE OF ANTICOAGULANTS: Primary | ICD-10-CM

## 2023-11-02 DIAGNOSIS — I25.10 CAD (CORONARY ARTERY DISEASE): Primary | ICD-10-CM

## 2023-11-02 NOTE — PROGRESS NOTES
[facility] faxed INR result reported via fax, therapeutic INR, no dose adjustments made. Continue current maintenance plan (see Anticoag Dosing Calendar). INR to be rechecked in two week(s). Tried to reach patient multiple times, no answer, no voice mail. Patient returned call INR results given confirmed dosing.  Patient had questions about her billing of $29.00 she will contact billing department//[email or web address]

## 2023-11-02 NOTE — PATIENT INSTRUCTIONS
Reminder: Please contact the Coumadin Clinic at [phone] when you have medication changes. Examples, new medications, antibiotics, discontinued medications, new supplements, missed doses of warfarin or if you took extra doses of warfarin. This also includes OTC medications. Notifying us helps reduce the possibility of high and low INR's. In addition, if warfarin needs to be held for any procedures, please have surgeon or physician's office contact us before holding anticoagulant. Thanks, [facility] Cardiology Coumadin Clinic.

## 2023-11-06 NOTE — TELEPHONE ENCOUNTER
Called patient who voiced understanding of Dr. Schwartz' response.   Pt states that she has researched this and talked with several friends in regards to this test and that she wants to have the test.

## 2023-11-06 NOTE — TELEPHONE ENCOUNTER
Calcium score is good to diagnose coronary disease in patients without prior cardiac history.  She has already had bypass.  I know her calcium score will be positive.  This will not serve any purpose and it will not change any management or medications at this point from a cardiovascular perspective.  The only thing it will do is cost her $115 and submit her to radiation that she does not need.  I would avoid getting this if she is agreeable.

## 2023-11-28 LAB — INR BLD: 2.5

## 2023-11-29 ENCOUNTER — ANTI-COAG VISIT (OUTPATIENT)
Age: 73
End: 2023-11-29
Payer: MEDICARE

## 2023-11-29 DIAGNOSIS — Z79.01 LONG TERM (CURRENT) USE OF ANTICOAGULANTS: Primary | ICD-10-CM

## 2023-11-29 DIAGNOSIS — I51.3 APICAL MURAL THROMBUS: ICD-10-CM

## 2023-11-29 PROCEDURE — 93793 ANTICOAG MGMT PT WARFARIN: CPT | Performed by: INTERNAL MEDICINE

## 2023-11-29 NOTE — PROGRESS NOTES
Home INR monitor result reported via fax, therapeutic INR, no dose adjustments made. I spoke with the patient, warfarin tablet strength and weekly dosing schedule confirmed today. Continue current maintenance plan (see Anticoag Dosing Calendar). INR to be rechecked in four week(s).

## 2023-12-10 ASSESSMENT — ENCOUNTER SYMPTOMS
COUGH: 0
SORE THROAT: 0
DIARRHEA: 0
ABDOMINAL PAIN: 0
PHOTOPHOBIA: 0
CONSTIPATION: 0
SHORTNESS OF BREATH: 0
ABDOMINAL DISTENTION: 0

## 2023-12-10 NOTE — PROGRESS NOTES
[facility] CARDIOLOGY  [address] [address]  PHONE: [phone]        NAME:  Robson Church  : 1950  MRN: [id number]     PCP:  Sukumar Simmons MD      SUBJECTIVE:   Robson Church is a 68 y.o. female seen for a follow up visit regarding the following:     Chief Complaint   Patient presents with    Coronary Artery Disease       HPI:    Doing well since last visit without interval angina, CHF, palpitations, edema, presyncope or syncope. Vitals controlled and tolerating meds well. Staying active without any significant limitations. Walking sporadically for exercise but not regularly. LDL high but taking 80mg lipitor daily. Intolerant to zetia in past x 2 with GI discomfort. She \"cannot give herself\" repatha injections. She considered [place] but isn't ready to commit. She will call me if she changes her mind. Echo 2023:  Left Ventricle Low normal left ventricular systolic function with a visually estimated EF of 50 - 55%. Left ventricle size is normal. Normal wall thickness. Akinesis of the apex. Abnormal diastolic function. Left Atrium Left atrium is mildly dilated. Right Ventricle Right ventricle size is normal. RV basal diameter is 3.9 cm. RV mid diameter is 2.7 cm. Normal systolic function. Right Atrium Right atrium is mildly dilated. Aortic Valve Sclerosis of the aortic valve cusp. Trace regurgitation. No stenosis. Mitral Valve Valve structure is normal. Mild regurgitation. No stenosis noted. Tricuspid Valve Valve structure is normal. Mild regurgitation. No stenosis noted. The estimated RVSP is 26 mmHg. Pulmonic Valve Valve structure is normal. Mild regurgitation. No stenosis noted. Aorta Ao root diameter is 3.3 cm. Pericardium No pericardial effusion. Doing well since last visit without interval angina, CHF, palpitations, edema, presyncope or syncope. Vitals controlled and tolerating meds well.  Staying active without

## 2023-12-12 ENCOUNTER — OFFICE VISIT (OUTPATIENT)
Age: 73
End: 2023-12-12
Payer: MEDICARE

## 2023-12-12 VITALS
HEART RATE: 76 BPM | WEIGHT: 127 LBS | SYSTOLIC BLOOD PRESSURE: 120 MMHG | DIASTOLIC BLOOD PRESSURE: 80 MMHG | HEIGHT: 67 IN | BODY MASS INDEX: 19.93 KG/M2

## 2023-12-12 DIAGNOSIS — I50.22 CHRONIC SYSTOLIC CONGESTIVE HEART FAILURE (HCC): ICD-10-CM

## 2023-12-12 DIAGNOSIS — I25.5 ISCHEMIC CARDIOMYOPATHY: ICD-10-CM

## 2023-12-12 DIAGNOSIS — I51.3 APICAL MURAL THROMBUS: ICD-10-CM

## 2023-12-12 DIAGNOSIS — I25.10 CORONARY ARTERY DISEASE INVOLVING NATIVE CORONARY ARTERY OF NATIVE HEART WITHOUT ANGINA PECTORIS: Primary | ICD-10-CM

## 2023-12-12 DIAGNOSIS — I10 PRIMARY HYPERTENSION: ICD-10-CM

## 2023-12-12 PROCEDURE — 99214 OFFICE O/P EST MOD 30 MIN: CPT | Performed by: INTERNAL MEDICINE

## 2023-12-12 PROCEDURE — 1123F ACP DISCUSS/DSCN MKR DOCD: CPT | Performed by: INTERNAL MEDICINE

## 2023-12-12 PROCEDURE — 3074F SYST BP LT 130 MM HG: CPT | Performed by: INTERNAL MEDICINE

## 2023-12-12 PROCEDURE — 3079F DIAST BP 80-89 MM HG: CPT | Performed by: INTERNAL MEDICINE

## 2023-12-12 RX ORDER — PANTOPRAZOLE SODIUM 40 MG/1
TABLET, DELAYED RELEASE ORAL
COMMUNITY
Start: 2023-11-30

## 2023-12-15 ENCOUNTER — TELEPHONE (OUTPATIENT)
Age: 73
End: 2023-12-15

## 2023-12-15 LAB
ALBUMIN SERPL-MCNC: 4.4 G/DL (ref 3.8–4.8)
ALP SERPL-CCNC: 75 IU/L (ref 44–121)
ALT SERPL-CCNC: 18 IU/L (ref 0–32)
AST SERPL-CCNC: 42 IU/L (ref 0–40)
BILIRUB DIRECT SERPL-MCNC: 0.18 MG/DL (ref 0–0.4)
BILIRUB SERPL-MCNC: 0.8 MG/DL (ref 0–1.2)
CHOLEST SERPL-MCNC: 234 MG/DL (ref 100–199)
HDLC SERPL-MCNC: 61 MG/DL
LDLC SERPL CALC-MCNC: 164 MG/DL (ref 0–99)
PROT SERPL-MCNC: 7.3 G/DL (ref 6–8.5)
TRIGL SERPL-MCNC: 56 MG/DL (ref 0–149)
VLDLC SERPL CALC-MCNC: 9 MG/DL (ref 5–40)

## 2023-12-15 NOTE — TELEPHONE ENCOUNTER
----- Message from Roger Schwartz MD sent at 12/15/2023  7:44 AM EST -----  Lipids even worse.  Cholesterol up to 164.  This will definitely increase her risk for cardiovascular problems and more blockages, more heart attacks, more heart failure.  I would strongly encourage her to consider Repatha.  If she wishes to come in and get a demonstration and get her first dose here in the office lets do that.  She has been hesitant because of the needles.  ----- Message -----  From: Devon Glasgow Incoming Amb Ref Lab Orders To [facility]  Sent: 12/15/2023   7:40 AM EST  To: Roger Schwartz MD

## 2024-01-05 LAB — INR BLD: 2.2

## 2024-01-08 ENCOUNTER — ANTI-COAG VISIT (OUTPATIENT)
Age: 74
End: 2024-01-08
Payer: MEDICARE

## 2024-01-08 DIAGNOSIS — Z79.01 LONG TERM (CURRENT) USE OF ANTICOAGULANTS: Primary | ICD-10-CM

## 2024-01-08 DIAGNOSIS — I51.3 APICAL MURAL THROMBUS: ICD-10-CM

## 2024-01-08 PROCEDURE — 93793 ANTICOAG MGMT PT WARFARIN: CPT | Performed by: INTERNAL MEDICINE

## 2024-01-08 NOTE — PROGRESS NOTES
INR result received from [facility], therapeutic INR, no dose adjustments made. Continue current maintenance plan (see Anticoag Dosing Calendar). INR to be rechecked in four week(s). Attempted to reach the patient, no answer and mail box full.

## 2024-01-10 ENCOUNTER — TELEPHONE (OUTPATIENT)
Age: 74
End: 2024-01-10

## 2024-01-10 NOTE — TELEPHONE ENCOUNTER
Just go ahead and start atorvastatin 40 mg daily with 10 mg Zetia and recheck lipid and liver in 3 months.  It does not matter what her genetics say if she is not willing to take the Repatha.  The atorvastatin and Zetia is the best we got except for adding Repatha.

## 2024-01-10 NOTE — TELEPHONE ENCOUNTER
Patient called stating that an April called about lab work and left her a message, I do not see a note on where they called. Patient would like me to send a message to the nurse for a call back.

## 2024-01-10 NOTE — TELEPHONE ENCOUNTER
Dr. Schwartz, Mrs. Bustillos expressed some concern with the side effects of Repatha. She feels after reading the latest research that the side effects outweigh the benefit.She is willing to try Zetia again-along with the atorvastatin if you would like. She is trying supplements to decrease the levels. She also wants to let you know that she is on pantoprazole 40 mg for  intestinal inflammation. \" The supplements were started a few months ago\". She would like to know if you would suggest she do genetic testing for predisposition of cholesterol?She is concerned why this levels are rising as she does not have a family history.  Micki Cullen

## 2024-01-11 RX ORDER — ATORVASTATIN CALCIUM 40 MG/1
40 TABLET, FILM COATED ORAL DAILY
Qty: 90 TABLET | Refills: 3 | Status: SHIPPED | OUTPATIENT
Start: 2024-01-11

## 2024-01-11 RX ORDER — ATORVASTATIN CALCIUM 40 MG/1
40 TABLET, FILM COATED ORAL DAILY
COMMUNITY
End: 2024-01-11 | Stop reason: SDUPTHER

## 2024-01-11 RX ORDER — EZETIMIBE 10 MG/1
10 TABLET ORAL DAILY
COMMUNITY
End: 2024-01-11 | Stop reason: SDUPTHER

## 2024-01-11 RX ORDER — EZETIMIBE 10 MG/1
10 TABLET ORAL DAILY
Qty: 90 TABLET | Refills: 3 | Status: SHIPPED | OUTPATIENT
Start: 2024-01-11

## 2024-01-11 NOTE — TELEPHONE ENCOUNTER
Zetia and Atorvastatin  prepared to send to Pharmacy. I tried to reach her a few times to let her know the plan today. I will try again tomorrow. Micki Cullen

## 2024-01-16 ENCOUNTER — TELEPHONE (OUTPATIENT)
Age: 74
End: 2024-01-16

## 2024-01-18 ENCOUNTER — TELEPHONE (OUTPATIENT)
Age: 74
End: 2024-01-18

## 2024-01-18 DIAGNOSIS — E78.5 DYSLIPIDEMIA: Primary | ICD-10-CM

## 2024-01-18 NOTE — TELEPHONE ENCOUNTER
Patient called stating she has the following issues :    Pharmacy filled her Rx for atorvastatin (LIPITOR) with a 40 MG tab instead of her normal 80 MG tab  Discuss new Rx for ezetimibe (ZETIA) 10 MG tablet   Please call and advise

## 2024-01-19 NOTE — TELEPHONE ENCOUNTER
I called and spoke to pt, I explained 's instructions. Pt voiced understanding.    Orders Placed This Encounter   Procedures    Lipid Panel     Standing Status:   Future     Standing Expiration Date:   1/19/2025    Hepatic Function Panel     Standing Status:   Future     Standing Expiration Date:   1/19/2025

## 2024-01-19 NOTE — TELEPHONE ENCOUNTER
start atorvastatin 40 mg daily with 10 mg Zetia and recheck lipid and liver in 3 months. Per  on 1/10/24

## 2024-02-22 LAB — INR BLD: 1.3

## 2024-02-23 ENCOUNTER — ANTI-COAG VISIT (OUTPATIENT)
Age: 74
End: 2024-02-23

## 2024-02-23 DIAGNOSIS — Z79.01 LONG TERM (CURRENT) USE OF ANTICOAGULANTS: Primary | ICD-10-CM

## 2024-02-23 DIAGNOSIS — I51.3 APICAL MURAL THROMBUS: ICD-10-CM

## 2024-03-18 DIAGNOSIS — Z79.01 LONG TERM (CURRENT) USE OF ANTICOAGULANTS: Primary | ICD-10-CM

## 2024-03-18 DIAGNOSIS — I51.3 APICAL MURAL THROMBUS: ICD-10-CM

## 2024-03-18 NOTE — PROGRESS NOTES
Patient called and requested new standing order be faxed to [facility]. She gave me fax number of [phone]

## 2024-03-19 ENCOUNTER — ANTI-COAG VISIT (OUTPATIENT)
Age: 74
End: 2024-03-19
Payer: MEDICARE

## 2024-03-19 DIAGNOSIS — Z79.01 LONG TERM (CURRENT) USE OF ANTICOAGULANTS: Primary | ICD-10-CM

## 2024-03-19 DIAGNOSIS — I51.3 APICAL MURAL THROMBUS: ICD-10-CM

## 2024-03-19 LAB
INR PPP: 1.7 (ref 0.9–1.2)
PROTHROMBIN TIME: 17.5 SEC (ref 9.1–12)

## 2024-03-19 PROCEDURE — 93793 ANTICOAG MGMT PT WARFARIN: CPT | Performed by: INTERNAL MEDICINE

## 2024-03-19 NOTE — PROGRESS NOTES
INR result received from [facility]. Warfarin tablet strength and weekly dosing schedule confirmed today.  Patient has been taking supplemental K2. Maintenance plan increased by 6.7 %, (see Anticoag Dosing Calendar). INR to be rechecked in two weeks.

## 2024-04-27 LAB
INR PPP: 1.2 (ref 0.9–1.2)
PROTHROMBIN TIME: 13.1 SEC (ref 9.1–12)

## 2024-04-29 ENCOUNTER — ANTI-COAG VISIT (OUTPATIENT)
Age: 74
End: 2024-04-29
Payer: MEDICARE

## 2024-04-29 DIAGNOSIS — I51.3 APICAL MURAL THROMBUS: ICD-10-CM

## 2024-04-29 DIAGNOSIS — Z79.01 LONG TERM (CURRENT) USE OF ANTICOAGULANTS: Primary | ICD-10-CM

## 2024-04-29 PROCEDURE — 93793 ANTICOAG MGMT PT WARFARIN: CPT | Performed by: INTERNAL MEDICINE

## 2024-04-29 NOTE — PROGRESS NOTES
Attempted to reach the patient, detailed message left asking her to return a call to [facility] Cardiology Coumadin [facility] office, [phone].

## 2024-04-30 NOTE — PROGRESS NOTES
I was able to reach the patient. Warfarin tablet strength and weekly dosing schedule confirmed today. Patient knows of no reason for subtherapeutic INR result other than taking \"lots of supplements\". She plans on discussing transitioning to Eliquis or Xarelto at her appointment with Dr. Schwartz. In the meantime, maintenance plan increased by 12.5 %, (see Anticoag Dosing Calendar). INR to be rechecked in four days.

## 2024-05-04 LAB
INR PPP: 1.2 (ref 0.9–1.2)
PROTHROMBIN TIME: 12.7 SEC (ref 9.1–12)

## 2024-05-06 ENCOUNTER — ANTI-COAG VISIT (OUTPATIENT)
Age: 74
End: 2024-05-06
Payer: MEDICARE

## 2024-05-06 DIAGNOSIS — I51.3 APICAL MURAL THROMBUS: ICD-10-CM

## 2024-05-06 DIAGNOSIS — Z79.01 LONG TERM (CURRENT) USE OF ANTICOAGULANTS: Primary | ICD-10-CM

## 2024-05-06 PROCEDURE — 93793 ANTICOAG MGMT PT WARFARIN: CPT | Performed by: INTERNAL MEDICINE

## 2024-05-06 NOTE — PROGRESS NOTES
INR result received/reviewed from [facility]. Subtherapeutic INR, patient has been taking supplements containing K1 and K2 which she has stated she will keep taking despite the interaction with warfarin. We have increased the patient's warfarin maintenance plan the last four INR's due to subtherapeutic results as followed below. Currently taking 15 mg every Tue and 12.5 mg all other days.   Contains abnormal data Pro[facility]-INR  Order: [id number]  Status: Final result       Visible to patient: Yes (not seen)       Next appt: 06/25/2024 at 03:45 PM in Cardiology (HANNAH ANDERSON MD)    0 Result Notes             Component  Ref Range & Units 5/3/24 1655 4/26/24 1658 3/18/24 1650 2/22/24 1/5/24 11/28/23 10/31/23 10/31/23   INR  0.9 - 1.2 1.2 1.2 CM 1.7 High  CM 1.30 R

## 2024-05-06 NOTE — PROGRESS NOTES
She needs to stop taking the supplements completely.  Once she stops supplements, take warfarin as she was taking when she was steady-state (prior to ever starting the vitamin K supplements).  Check INR 5 days after changing back to her original chronically stable regimen and then every 2 weeks thereafter to ensure that she remains stable.  She needs to stop taking the supplements, as they are making her blood too thick and she could risk clotting in her ventricle and having a stroke or worse

## 2024-05-06 NOTE — PROGRESS NOTES
Attempted to reach the patient multiple times today, voice mailbox full. Could not leave a message. I will have to continue trying to reach the patient.

## 2024-05-10 ENCOUNTER — OFFICE VISIT (OUTPATIENT)
Age: 74
End: 2024-05-10
Payer: MEDICARE

## 2024-05-10 VITALS
SYSTOLIC BLOOD PRESSURE: 133 MMHG | OXYGEN SATURATION: 98 % | DIASTOLIC BLOOD PRESSURE: 62 MMHG | HEIGHT: 67 IN | BODY MASS INDEX: 19.65 KG/M2 | WEIGHT: 125.2 LBS | RESPIRATION RATE: 16 BRPM | HEART RATE: 71 BPM | TEMPERATURE: 97.5 F

## 2024-05-10 DIAGNOSIS — R01.1 SYSTOLIC MURMUR: ICD-10-CM

## 2024-05-10 DIAGNOSIS — Z79.01 LONG TERM (CURRENT) USE OF ANTICOAGULANTS: ICD-10-CM

## 2024-05-10 DIAGNOSIS — E11.9 TYPE 2 DIABETES MELLITUS WITHOUT COMPLICATION, WITHOUT LONG-TERM CURRENT USE OF INSULIN (HCC): ICD-10-CM

## 2024-05-10 DIAGNOSIS — E78.00 HYPERCHOLESTEREMIA: ICD-10-CM

## 2024-05-10 DIAGNOSIS — R94.31 ABNORMAL ECG: ICD-10-CM

## 2024-05-10 DIAGNOSIS — I25.810 CORONARY ARTERY DISEASE INVOLVING CORONARY BYPASS GRAFT OF NATIVE HEART WITHOUT ANGINA PECTORIS: Primary | ICD-10-CM

## 2024-05-10 DIAGNOSIS — I42.0 CONGESTIVE CARDIOMYOPATHY (HCC): ICD-10-CM

## 2024-05-10 PROCEDURE — 99205 OFFICE O/P NEW HI 60 MIN: CPT | Performed by: INTERNAL MEDICINE

## 2024-05-10 PROCEDURE — 3078F DIAST BP <80 MM HG: CPT | Performed by: INTERNAL MEDICINE

## 2024-05-10 PROCEDURE — 3075F SYST BP GE 130 - 139MM HG: CPT | Performed by: INTERNAL MEDICINE

## 2024-05-10 PROCEDURE — 1123F ACP DISCUSS/DSCN MKR DOCD: CPT | Performed by: INTERNAL MEDICINE

## 2024-05-10 ASSESSMENT — ENCOUNTER SYMPTOMS
SHORTNESS OF BREATH: 0
EYES NEGATIVE: 1
ALLERGIC/IMMUNOLOGIC NEGATIVE: 1
GASTROINTESTINAL NEGATIVE: 1

## 2024-05-10 NOTE — PROGRESS NOTES
1. \"Have you been to the ER, urgent care clinic since your last visit?  Hospitalized since your last visit?\" Reviewed by Dr. Joel Carmona    2. \"Have you seen or consulted any other health care providers outside of the [facility] since your last visit?\" Reviewed by Dr. Joel Carmona   
ABDOMINAL (CERVIX REMOVED)  1985    fibroids and dysplasia        Allergies   Allergen Reactions    Ezetimibe      Myalgias    Niaspan [Niacin]      Myalgias        Current Outpatient Medications   Medication Sig Dispense Refill    metFORMIN (GLUCOPHAGE) 500 MG tablet Take 2 tablets by mouth 2 times daily      atorvastatin (LIPITOR) 40 MG tablet Take 1 tablet by mouth daily 90 tablet 3    ezetimibe (ZETIA) 10 MG tablet Take 1 tablet by mouth daily 90 tablet 3    pantoprazole (PROTONIX) 40 MG tablet       Multiple Vitamins-Minerals (MULTI COMPLETE PO) Take by mouth daily      Cyanocobalamin (B-12 PO) Take by mouth daily      Cholecalciferol (D3 PO) Take by mouth daily      COD LIVER OIL PO Take by mouth daily      Flaxseed, Linseed, (FLAX SEED OIL PO) Take by mouth daily      MAGNESIUM CITRATE PO Take 420 mg by mouth Will start      carvedilol (COREG) 6.25 MG tablet Take 1 tablet by mouth 2 times daily TAKE ONE TABLET BY MOUTH TWICE A DAY WITH MEAL(S) 180 tablet 3    ramipril (ALTACE) 10 MG capsule Take 1 capsule by mouth daily 90 capsule 3    warfarin (COUMADIN) 5 MG tablet Take 2 tabs every day or as directed 180 tablet 3    ergocalciferol (ERGOCALCIFEROL) 1.25 MG (23636 UT) capsule Take 1 capsule by mouth once a week      aspirin 81 MG EC tablet Take by mouth daily      metFORMIN (GLUCOPHAGE-XR) 500 MG extended release tablet 1 tablet Takes two am, Two pm       No current facility-administered medications for this visit.        Social History     Tobacco Use    Smoking status: Never    Smokeless tobacco: Never   Vaping Use    Vaping Use: Never used   Substance Use Topics    Alcohol use: Not Currently    Drug use: No        Family History   Problem Relation Age of Onset    Cancer Sister 47        COLON    Cancer Maternal Aunt         Colon    Cancer Maternal Uncle         Bone    Elevated Lipids Sister     Diabetes Paternal Uncle     Elevated Lipids Sister     Elevated Lipids Sister     Elevated Lipids Sister

## 2024-05-13 DIAGNOSIS — E78.00 HYPERCHOLESTEREMIA: Primary | ICD-10-CM

## 2024-05-13 DIAGNOSIS — E11.9 TYPE 2 DIABETES MELLITUS WITHOUT COMPLICATION, WITHOUT LONG-TERM CURRENT USE OF INSULIN (HCC): ICD-10-CM

## 2024-05-13 DIAGNOSIS — I25.10 CORONARY ARTERY DISEASE INVOLVING NATIVE CORONARY ARTERY OF NATIVE HEART WITHOUT ANGINA PECTORIS: ICD-10-CM

## 2024-05-13 DIAGNOSIS — I10 PRIMARY HYPERTENSION: ICD-10-CM

## 2024-05-14 ENCOUNTER — HOSPITAL ENCOUNTER (OUTPATIENT)
Facility: HOSPITAL | Age: 74
Discharge: HOME | End: 2024-05-17
Payer: MEDICARE

## 2024-05-14 DIAGNOSIS — E11.9 TYPE 2 DIABETES MELLITUS WITHOUT COMPLICATION, WITHOUT LONG-TERM CURRENT USE OF INSULIN (HCC): ICD-10-CM

## 2024-05-14 DIAGNOSIS — I25.10 CORONARY ARTERY DISEASE INVOLVING NATIVE CORONARY ARTERY OF NATIVE HEART WITHOUT ANGINA PECTORIS: ICD-10-CM

## 2024-05-14 DIAGNOSIS — E78.00 HYPERCHOLESTEREMIA: ICD-10-CM

## 2024-05-14 DIAGNOSIS — I10 PRIMARY HYPERTENSION: ICD-10-CM

## 2024-05-14 LAB
ALBUMIN SERPL-MCNC: 3.7 G/DL (ref 3.4–5)
ALBUMIN/GLOB SERPL: 1.2 (ref 0.8–1.7)
ALP SERPL-CCNC: 77 U/L (ref 45–117)
ALT SERPL-CCNC: 30 U/L (ref 13–56)
ANION GAP SERPL CALC-SCNC: 7 MMOL/L (ref 3–18)
AST SERPL-CCNC: 35 U/L (ref 10–38)
BILIRUB SERPL-MCNC: 0.6 MG/DL (ref 0.2–1)
BUN SERPL-MCNC: 14 MG/DL (ref 7–18)
BUN/CREAT SERPL: 19 (ref 12–20)
CALCIUM SERPL-MCNC: 9.5 MG/DL (ref 8.5–10.1)
CHLORIDE SERPL-SCNC: 106 MMOL/L (ref 100–111)
CHOLEST SERPL-MCNC: 193 MG/DL
CO2 SERPL-SCNC: 28 MMOL/L (ref 21–32)
CREAT SERPL-MCNC: 0.72 MG/DL (ref 0.6–1.3)
CRP SERPL-MCNC: <0.3 MG/DL (ref 0–0.3)
ERYTHROCYTE [DISTWIDTH] IN BLOOD BY AUTOMATED COUNT: 14.9 % (ref 11.6–14.5)
ERYTHROCYTE [SEDIMENTATION RATE] IN BLOOD: 10 MM/HR (ref 0–30)
EST. AVERAGE GLUCOSE BLD GHB EST-MCNC: 123 MG/DL
GLOBULIN SER CALC-MCNC: 3.1 G/DL (ref 2–4)
GLUCOSE SERPL-MCNC: 118 MG/DL (ref 74–99)
HBA1C MFR BLD: 5.9 % (ref 4.2–5.6)
HCT VFR BLD AUTO: 36.2 % (ref 35–45)
HDLC SERPL-MCNC: 69 MG/DL (ref 40–60)
HDLC SERPL: 2.8 (ref 0–5)
HGB BLD-MCNC: 11.5 G/DL (ref 12–16)
LDLC SERPL CALC-MCNC: 118 MG/DL (ref 0–100)
LIPID PANEL: ABNORMAL
MCH RBC QN AUTO: 28.5 PG (ref 24–34)
MCHC RBC AUTO-ENTMCNC: 31.8 G/DL (ref 31–37)
MCV RBC AUTO: 89.8 FL (ref 78–100)
NRBC # BLD: 0 K/UL (ref 0–0.01)
NRBC BLD-RTO: 0 PER 100 WBC
PLATELET # BLD AUTO: 197 K/UL (ref 135–420)
PMV BLD AUTO: 11.7 FL (ref 9.2–11.8)
POTASSIUM SERPL-SCNC: 4.3 MMOL/L (ref 3.5–5.5)
PROT SERPL-MCNC: 6.8 G/DL (ref 6.4–8.2)
RBC # BLD AUTO: 4.03 M/UL (ref 4.2–5.3)
SODIUM SERPL-SCNC: 141 MMOL/L (ref 136–145)
T4 FREE SERPL-MCNC: 1.1 NG/DL (ref 0.7–1.5)
TRIGL SERPL-MCNC: 30 MG/DL
TSH SERPL DL<=0.05 MIU/L-ACNC: 0.35 UIU/ML (ref 0.36–3.74)
VLDLC SERPL CALC-MCNC: 6 MG/DL
WBC # BLD AUTO: 4.9 K/UL (ref 4.6–13.2)

## 2024-05-14 PROCEDURE — 85652 RBC SED RATE AUTOMATED: CPT

## 2024-05-14 PROCEDURE — 85027 COMPLETE CBC AUTOMATED: CPT

## 2024-05-14 PROCEDURE — 86140 C-REACTIVE PROTEIN: CPT

## 2024-05-14 PROCEDURE — 84443 ASSAY THYROID STIM HORMONE: CPT

## 2024-05-14 PROCEDURE — 83036 HEMOGLOBIN GLYCOSYLATED A1C: CPT

## 2024-05-14 PROCEDURE — 80061 LIPID PANEL: CPT

## 2024-05-14 PROCEDURE — 80053 COMPREHEN METABOLIC PANEL: CPT

## 2024-05-14 PROCEDURE — 84439 ASSAY OF FREE THYROXINE: CPT

## 2024-05-14 PROCEDURE — 36415 COLL VENOUS BLD VENIPUNCTURE: CPT

## 2024-05-25 LAB
INR PPP: 2.3 (ref 0.9–1.2)
PROTHROMBIN TIME: 23.9 SEC (ref 9.1–12)

## 2024-05-29 ENCOUNTER — ANTI-COAG VISIT (OUTPATIENT)
Age: 74
End: 2024-05-29
Payer: MEDICARE

## 2024-05-29 DIAGNOSIS — Z79.01 LONG TERM (CURRENT) USE OF ANTICOAGULANTS: Primary | ICD-10-CM

## 2024-05-29 DIAGNOSIS — I51.3 APICAL MURAL THROMBUS: ICD-10-CM

## 2024-05-29 PROCEDURE — 93793 ANTICOAG MGMT PT WARFARIN: CPT | Performed by: INTERNAL MEDICINE

## 2024-05-29 NOTE — PROGRESS NOTES
INR result received/reviewed from [facility], therapeutic result of 2.3 form 05/24/2024. I spoke with the patient, warfarin tablet strength and weekly dosing schedule confirmed today.  Patient COULD NOT confirm how she has been taking her warfarin, she stated she will have to call me back tomorrow. I left the dosing calendar as is until she confirms her current maintenance plan.

## 2024-05-31 NOTE — PROGRESS NOTES
Voicemail retrieved this morning, patient called yesterday and verified her current maintenance plan as 10 mg every Mon, Wed, Fri; 12.5 mg all other days. Dosing calendar updated to reflect correct dosing.

## 2024-06-23 NOTE — PROGRESS NOTES
[facility] CARDIOLOGY  [address] [place] [address], [address]  [place] [address]  PHONE: [phone]        NAME:  Barbie Bustillos  : 1950  MRN: [id number]     PCP:  Marisol Mckay MD      SUBJECTIVE:   Barbie Bustillos is a 73 y.o. female seen for a follow up visit regarding the following:     Chief Complaint   Patient presents with    Coronary Artery Disease    Cardiomyopathy    Hypertension    Congestive Heart Failure       HPI:    Doing well since last visit without interval angina, CHF, palpitations, edema, presyncope or syncope.  Vitals controlled and tolerating meds well. Staying active without any significant limitations.   Walking sporadically for exercise but not regularly.  LDL up to the 150s earlier this year but with dietary modification, sporadic walking, and taking atorvastatin/Zetia combo her LDL is down to 118.  She has been intolerant to 80 mg atorvastatin with severe myalgias but is tolerating 40 mg fairly well.  She has an aversion to needles and cannot give herself Repatha/Praluent or Leqvio.  I discussed once again today but she refuses to consider.    Echo 2023:  Left Ventricle Low normal left ventricular systolic function with a visually estimated EF of 50 - 55%. Left ventricle size is normal. Normal wall thickness. Akinesis of the apex. Abnormal diastolic function.   Left Atrium Left atrium is mildly dilated.   Right Ventricle Right ventricle size is normal. RV basal diameter is 3.9 cm. RV mid diameter is 2.7 cm. Normal systolic function.   Right Atrium Right atrium is mildly dilated.   Aortic Valve Sclerosis of the aortic valve cusp. Trace regurgitation. No stenosis.   Mitral Valve Valve structure is normal. Mild regurgitation. No stenosis noted.   Tricuspid Valve Valve structure is normal. Mild regurgitation. No stenosis noted. The estimated RVSP is 26 mmHg.   Pulmonic Valve Valve structure is normal. Mild regurgitation. No stenosis noted.   Aorta Ao root diameter is

## 2024-06-25 ENCOUNTER — OFFICE VISIT (OUTPATIENT)
Age: 74
End: 2024-06-25
Payer: MEDICARE

## 2024-06-25 VITALS
DIASTOLIC BLOOD PRESSURE: 82 MMHG | SYSTOLIC BLOOD PRESSURE: 142 MMHG | HEART RATE: 82 BPM | BODY MASS INDEX: 19.34 KG/M2 | WEIGHT: 123.2 LBS | HEIGHT: 67 IN

## 2024-06-25 DIAGNOSIS — I50.22 CHRONIC SYSTOLIC CONGESTIVE HEART FAILURE (HCC): ICD-10-CM

## 2024-06-25 DIAGNOSIS — I25.810 CORONARY ARTERY DISEASE INVOLVING NONAUTOLOGOUS BIOLOGICAL CORONARY BYPASS GRAFT WITHOUT ANGINA PECTORIS: Primary | ICD-10-CM

## 2024-06-25 DIAGNOSIS — E78.5 DYSLIPIDEMIA: ICD-10-CM

## 2024-06-25 DIAGNOSIS — I10 PRIMARY HYPERTENSION: ICD-10-CM

## 2024-06-25 DIAGNOSIS — I25.5 ISCHEMIC CARDIOMYOPATHY: ICD-10-CM

## 2024-06-25 DIAGNOSIS — Z95.1 HX OF CABG: ICD-10-CM

## 2024-06-25 PROCEDURE — 99214 OFFICE O/P EST MOD 30 MIN: CPT | Performed by: INTERNAL MEDICINE

## 2024-06-25 PROCEDURE — 3079F DIAST BP 80-89 MM HG: CPT | Performed by: INTERNAL MEDICINE

## 2024-06-25 PROCEDURE — 3077F SYST BP >= 140 MM HG: CPT | Performed by: INTERNAL MEDICINE

## 2024-06-25 PROCEDURE — 1123F ACP DISCUSS/DSCN MKR DOCD: CPT | Performed by: INTERNAL MEDICINE

## 2024-06-25 PROCEDURE — 93000 ELECTROCARDIOGRAM COMPLETE: CPT | Performed by: INTERNAL MEDICINE

## 2024-06-26 LAB
INR PPP: 2 (ref 0.9–1.2)
PROTHROMBIN TIME: 20.8 SEC (ref 9.1–12)

## 2024-06-27 ENCOUNTER — ANTI-COAG VISIT (OUTPATIENT)
Age: 74
End: 2024-06-27
Payer: MEDICARE

## 2024-06-27 DIAGNOSIS — I51.3 APICAL MURAL THROMBUS: ICD-10-CM

## 2024-06-27 DIAGNOSIS — Z79.01 LONG TERM (CURRENT) USE OF ANTICOAGULANTS: Primary | ICD-10-CM

## 2024-06-27 PROCEDURE — 93793 ANTICOAG MGMT PT WARFARIN: CPT | Performed by: INTERNAL MEDICINE

## 2024-06-27 NOTE — PROGRESS NOTES
INR result received/reviewed from [facility]. Therapeutic INR, patient to continue current maintenance plan (see Anticoag Dosing Calendar). INR to be rechecked in four week(s). Attempted to reach the patient, voice mailbox full, could not leave a message.

## 2024-08-16 ENCOUNTER — ANTI-COAG VISIT (OUTPATIENT)
Age: 74
End: 2024-08-16

## 2024-08-16 DIAGNOSIS — I51.3 APICAL MURAL THROMBUS: ICD-10-CM

## 2024-08-16 DIAGNOSIS — Z79.01 LONG TERM (CURRENT) USE OF ANTICOAGULANTS: Primary | ICD-10-CM

## 2024-08-16 LAB
INR PPP: 1.8 (ref 0.9–1.2)
PROTHROMBIN TIME: 19.2 SEC (ref 9.1–12)

## 2024-09-04 DIAGNOSIS — Z79.01 LONG TERM (CURRENT) USE OF ANTICOAGULANTS: Primary | ICD-10-CM

## 2024-09-04 DIAGNOSIS — I51.3 APICAL MURAL THROMBUS: ICD-10-CM

## 2024-09-04 NOTE — TELEPHONE ENCOUNTER
MEDICATION REFILL REQUEST      Name of Medication:  jantoven  Dose:  5 mg  Frequency:  BID  Quantity:  180  Days' supply:  90 with 3 refills      Pharmacy Name/Location:  [id number]

## 2024-09-05 NOTE — TELEPHONE ENCOUNTER
Attempted to reach the patient, detailed message left asking her to return a call to [facility] Cardiology Coumadin Clinic [place] office, [phone].  Multiple messages left for the patient to review her last INR result, she never called back.  I need to confirm how she is currently taking her warfarin.

## 2024-09-06 RX ORDER — WARFARIN SODIUM 5 MG/1
TABLET ORAL
Qty: 200 TABLET | Refills: 3 | Status: SHIPPED | OUTPATIENT
Start: 2024-09-06

## 2024-09-18 ENCOUNTER — ANTI-COAG VISIT (OUTPATIENT)
Age: 74
End: 2024-09-18

## 2024-09-18 DIAGNOSIS — I51.3 APICAL MURAL THROMBUS: ICD-10-CM

## 2024-09-18 DIAGNOSIS — Z79.01 LONG TERM (CURRENT) USE OF ANTICOAGULANTS: Primary | ICD-10-CM

## 2024-09-18 LAB
INR PPP: 2.5 (ref 0.9–1.2)
PROTHROMBIN TIME: 26.1 SEC (ref 9.1–12)

## 2024-11-14 ENCOUNTER — ANTI-COAG VISIT (OUTPATIENT)
Age: 74
End: 2024-11-14

## 2024-11-14 DIAGNOSIS — Z79.01 LONG TERM (CURRENT) USE OF ANTICOAGULANTS: Primary | ICD-10-CM

## 2024-11-14 DIAGNOSIS — I51.3 APICAL MURAL THROMBUS: ICD-10-CM

## 2024-11-14 LAB
INR PPP: 1.6 (ref 0.9–1.2)
PROTHROMBIN TIME: 17.8 SEC (ref 9.1–12)

## 2024-11-14 NOTE — PROGRESS NOTES
Attempted to reach the patient via cell number, mailbox full, could not leave a message. Attempted to reach the patient via home number, detailed message left asking her to return a call to [facility] Cardiology Coumadin [facility] office, [phone].

## 2025-01-04 LAB
INR PPP: 2.1 (ref 0.9–1.2)
PROTHROMBIN TIME: 22.5 SEC (ref 9.1–12)

## 2025-01-06 ENCOUNTER — ANTI-COAG VISIT (OUTPATIENT)
Age: 75
End: 2025-01-06

## 2025-01-06 DIAGNOSIS — I51.3 APICAL MURAL THROMBUS: ICD-10-CM

## 2025-01-06 DIAGNOSIS — Z79.01 LONG TERM (CURRENT) USE OF ANTICOAGULANTS: Primary | ICD-10-CM

## 2025-01-06 NOTE — PROGRESS NOTES
INR result received/reviewed from [facility]. Attempted to reach the patient, detailed message left asking her to return a call to [facility] Cardiology Coumadin Clinic [place] office, [phone].

## 2025-01-06 NOTE — PATIENT INSTRUCTIONS
Reminder: Please contact the Coumadin Clinic at [phone] when you have medication changes. Examples, new medications, antibiotics, discontinued medications, new supplements, missed doses of warfarin or if you took extra doses of warfarin.  This also includes OTC medications. Notifying us helps reduce the possibility of high and low INR's. In addition, if warfarin needs to be held for any procedures, please have surgeon or physician's office contact us before holding anticoagulant. Thanks, [facility] Cardiology Coumadin Clinic.       Please go to the Emergency Department if you experience:  - Extreme shortness of breath or chest pain  - Red, warm, painful, swollen limb  - Numbness or tingling on one side of the body  - Slurred speech or major vision change  - Extreme headache

## 2025-02-07 ENCOUNTER — ANTI-COAG VISIT (OUTPATIENT)
Age: 75
End: 2025-02-07

## 2025-02-07 DIAGNOSIS — Z79.01 LONG TERM (CURRENT) USE OF ANTICOAGULANTS: Primary | ICD-10-CM

## 2025-02-07 DIAGNOSIS — I51.3 APICAL MURAL THROMBUS: ICD-10-CM

## 2025-02-07 LAB
INR PPP: 1.6 (ref 0.9–1.2)
PROTHROMBIN TIME: 17.2 SEC (ref 9.1–12)

## 2025-02-07 NOTE — PROGRESS NOTES
INR result received/reviewed from [facility].     I attempted to contact pt via home phone and cell phone. No answer and unable to leave VM.

## 2025-03-05 ENCOUNTER — TELEPHONE (OUTPATIENT)
Age: 75
End: 2025-03-05

## 2025-03-05 DIAGNOSIS — Z79.01 LONG TERM (CURRENT) USE OF ANTICOAGULANTS: Primary | ICD-10-CM

## 2025-03-05 NOTE — TELEPHONE ENCOUNTER
Pt called and requested we fax a INR order to [facility]. I faxed order as requested.     Orders Placed This Encounter   Procedures    AMB POC PT/INR     Standing Status:   Standing     Number of Occurrences:   40     Standing Expiration Date:   9/5/2026

## 2025-03-06 LAB
INR PPP: 1.2 (ref 0.9–1.2)
PROTHROMBIN TIME: 12.9 SEC (ref 9.1–12)

## 2025-03-25 ENCOUNTER — TELEPHONE (OUTPATIENT)
Age: 75
End: 2025-03-25

## 2025-03-25 DIAGNOSIS — I51.3 APICAL MURAL THROMBUS: ICD-10-CM

## 2025-03-25 DIAGNOSIS — Z79.01 LONG TERM (CURRENT) USE OF ANTICOAGULANTS: Primary | ICD-10-CM

## 2025-03-25 NOTE — TELEPHONE ENCOUNTER
Fly with [facility] called stating she needs the following :    Lab Orders  Including what the orders are for  Tele and Fax numbers included  Signature of Provider    Fax #    Tele # [phone]  Ext [phone]    Fax # [phone]    Please call and advise

## 2025-04-04 LAB
INR PPP: 1.8 (ref 0.9–1.2)
PROTHROMBIN TIME: 19 SEC (ref 9.1–12)

## 2025-04-09 ENCOUNTER — TELEPHONE (OUTPATIENT)
Age: 75
End: 2025-04-09

## 2025-04-10 ENCOUNTER — ANTI-COAG VISIT (OUTPATIENT)
Age: 75
End: 2025-04-10
Payer: MEDICARE

## 2025-04-10 DIAGNOSIS — Z79.01 LONG TERM (CURRENT) USE OF ANTICOAGULANTS: Primary | ICD-10-CM

## 2025-04-10 DIAGNOSIS — I51.3 APICAL MURAL THROMBUS: ICD-10-CM

## 2025-04-10 PROCEDURE — 93793 ANTICOAG MGMT PT WARFARIN: CPT | Performed by: INTERNAL MEDICINE

## 2025-04-10 NOTE — PATIENT INSTRUCTIONS
Reminder: Please contact the Coumadin Clinic at [phone] when you have medication changes. Examples, new medications, antibiotics, discontinued medications, new supplements, missed doses of warfarin or if you took extra doses of warfarin.  This also includes OTC medications. Notifying us helps reduce the possibility of high and low INR's. In addition, if warfarin needs to be held for any procedures, please have surgeon or physician's office contact us before holding anticoagulant. Thanks, [facility] Cardiology Coumadin Clinic.         Please go to the Emergency Department if you experience:  - Extreme shortness of breath or chest pain  - Red, warm, painful, swollen limb  - Numbness or tingling on one side of the body  - Slurred speech or major vision change  - Extreme headache

## 2025-04-10 NOTE — PROGRESS NOTES
[facility] INR monitor result reported via fax, therapeutic INR, no dose adjustments made. Continue current maintenance plan (see Anticoag Dosing Calendar). INR to be rechecked in 2 week(s).

## 2025-04-19 LAB
INR PPP: 1.4 (ref 0.9–1.2)
PROTHROMBIN TIME: 15.7 SEC (ref 9.1–12)

## 2025-04-22 ENCOUNTER — ANTI-COAG VISIT (OUTPATIENT)
Age: 75
End: 2025-04-22

## 2025-04-22 DIAGNOSIS — I51.3 APICAL MURAL THROMBUS: ICD-10-CM

## 2025-04-22 DIAGNOSIS — Z79.01 LONG TERM (CURRENT) USE OF ANTICOAGULANTS: Primary | ICD-10-CM

## 2025-05-04 NOTE — PROGRESS NOTES
diet.  Try to walk more for exercise at least 4-5 times weekly.  She does not think she can give herself twice monthly injections of Repatha or Praluent but is also not willing to consider Leqvio.  She will call me if and when she changes her mind but she wishes to make no change in meds today.     Long term (current) use of anticoagulants-see below.   INR labile and subtherapeutic now.    Wants to change to eliquis today.     Chronic systolic congestive heart failure (HCC)-stable, recheck echo in 12 months.  Clinically euvolemic and medically maximized    Primary hypertension-controlled, continue meds and titrate.  Low-sodium diet lifelong    Ischemic cardiomyopathy-well compensated, continue meds, increase dietary measures and exercise daily as tolerated    Apical mural thrombus-lifelong anticoagulation as tolerated.  Convert to eliquis today.     Other orders  Regarding Leqvio and Repatha- she has aversion to injections and just isn't ready to commit.  She wants to continue diet/exercise/atorvastatin and zetia for now    Stop warfarin, start eliquis.  2-week samples and a 30-day voucher given, prescription called into pharmacy          Return in about 6 months (around 11/6/2025).         HANNAH ANDERSON MD  5/6/2025  2:23 PM

## 2025-05-06 ENCOUNTER — OFFICE VISIT (OUTPATIENT)
Age: 75
End: 2025-05-06
Payer: MEDICARE

## 2025-05-06 ENCOUNTER — ANTI-COAG VISIT (OUTPATIENT)
Age: 75
End: 2025-05-06
Payer: MEDICARE

## 2025-05-06 VITALS
SYSTOLIC BLOOD PRESSURE: 108 MMHG | HEIGHT: 67 IN | WEIGHT: 123 LBS | HEART RATE: 80 BPM | DIASTOLIC BLOOD PRESSURE: 76 MMHG | BODY MASS INDEX: 19.3 KG/M2

## 2025-05-06 DIAGNOSIS — Z79.01 LONG TERM (CURRENT) USE OF ANTICOAGULANTS: ICD-10-CM

## 2025-05-06 DIAGNOSIS — I51.3 APICAL MURAL THROMBUS: ICD-10-CM

## 2025-05-06 DIAGNOSIS — I50.22 CHRONIC SYSTOLIC CONGESTIVE HEART FAILURE (HCC): ICD-10-CM

## 2025-05-06 DIAGNOSIS — Z79.01 LONG TERM (CURRENT) USE OF ANTICOAGULANTS: Primary | ICD-10-CM

## 2025-05-06 DIAGNOSIS — I25.810 CORONARY ARTERY DISEASE INVOLVING NONAUTOLOGOUS BIOLOGICAL CORONARY BYPASS GRAFT WITHOUT ANGINA PECTORIS: ICD-10-CM

## 2025-05-06 DIAGNOSIS — Z95.1 HX OF CABG: Primary | ICD-10-CM

## 2025-05-06 DIAGNOSIS — I10 PRIMARY HYPERTENSION: ICD-10-CM

## 2025-05-06 DIAGNOSIS — I25.5 ISCHEMIC CARDIOMYOPATHY: ICD-10-CM

## 2025-05-06 DIAGNOSIS — E78.5 DYSLIPIDEMIA: ICD-10-CM

## 2025-05-06 LAB
POC INR: 1.2
PROTHROMBIN TIME, POC: NORMAL

## 2025-05-06 PROCEDURE — 1159F MED LIST DOCD IN RCRD: CPT | Performed by: INTERNAL MEDICINE

## 2025-05-06 PROCEDURE — 1160F RVW MEDS BY RX/DR IN RCRD: CPT | Performed by: INTERNAL MEDICINE

## 2025-05-06 PROCEDURE — 85610 PROTHROMBIN TIME: CPT | Performed by: INTERNAL MEDICINE

## 2025-05-06 PROCEDURE — 93000 ELECTROCARDIOGRAM COMPLETE: CPT | Performed by: INTERNAL MEDICINE

## 2025-05-06 PROCEDURE — 99214 OFFICE O/P EST MOD 30 MIN: CPT | Performed by: INTERNAL MEDICINE

## 2025-05-06 PROCEDURE — 1126F AMNT PAIN NOTED NONE PRSNT: CPT | Performed by: INTERNAL MEDICINE

## 2025-05-06 PROCEDURE — 3074F SYST BP LT 130 MM HG: CPT | Performed by: INTERNAL MEDICINE

## 2025-05-06 PROCEDURE — 3078F DIAST BP <80 MM HG: CPT | Performed by: INTERNAL MEDICINE

## 2025-05-06 PROCEDURE — 1123F ACP DISCUSS/DSCN MKR DOCD: CPT | Performed by: INTERNAL MEDICINE

## 2025-05-06 RX ORDER — EZETIMIBE 10 MG/1
10 TABLET ORAL DAILY
Qty: 90 TABLET | Refills: 3 | Status: SHIPPED | OUTPATIENT
Start: 2025-05-06

## 2025-05-06 RX ORDER — WARFARIN SODIUM 5 MG/1
TABLET ORAL
Qty: 200 TABLET | Refills: 3 | Status: SHIPPED | OUTPATIENT
Start: 2025-05-06 | End: 2025-05-06

## 2025-05-06 RX ORDER — ATORVASTATIN CALCIUM 40 MG/1
40 TABLET, FILM COATED ORAL DAILY
Qty: 90 TABLET | Refills: 3 | Status: SHIPPED | OUTPATIENT
Start: 2025-05-06

## 2025-05-06 RX ORDER — RAMIPRIL 10 MG/1
10 CAPSULE ORAL DAILY
Qty: 90 CAPSULE | Refills: 3 | Status: SHIPPED | OUTPATIENT
Start: 2025-05-06

## 2025-05-06 RX ORDER — ASCORBIC ACID 500 MG
1000 TABLET ORAL DAILY
COMMUNITY

## 2025-05-06 RX ORDER — CARVEDILOL 6.25 MG/1
6.25 TABLET ORAL 2 TIMES DAILY
Qty: 180 TABLET | Refills: 3 | Status: SHIPPED | OUTPATIENT
Start: 2025-05-06

## 2025-05-13 ENCOUNTER — TELEPHONE (OUTPATIENT)
Age: 75
End: 2025-05-13

## 2025-05-14 ENCOUNTER — ANTI-COAG VISIT (OUTPATIENT)
Age: 75
End: 2025-05-14

## 2025-05-14 DIAGNOSIS — Z79.01 LONG TERM (CURRENT) USE OF ANTICOAGULANTS: Primary | ICD-10-CM

## 2025-05-14 DIAGNOSIS — I51.3 APICAL MURAL THROMBUS: ICD-10-CM
